# Patient Record
Sex: FEMALE | Race: WHITE | NOT HISPANIC OR LATINO | Employment: PART TIME | URBAN - METROPOLITAN AREA
[De-identification: names, ages, dates, MRNs, and addresses within clinical notes are randomized per-mention and may not be internally consistent; named-entity substitution may affect disease eponyms.]

---

## 2016-09-21 LAB
EXTERNAL ABO GROUPING: NORMAL
EXTERNAL ANTIBODY SCREEN: NEGATIVE
EXTERNAL GROUP B STREP ANTIGEN: NEGATIVE
EXTERNAL HEPATITIS B SURFACE ANTIGEN: NEGATIVE
EXTERNAL HEPATITIS C, RNA QUANT PCR: NON REACTIVE
EXTERNAL RH FACTOR: POSITIVE
EXTERNAL RUBELLA QUALITATIVE: NORMAL
EXTERNAL SYPHILIS RPR SCREEN: NORMAL
EXTERNAL URINE DRUG SCREEN: NEGATIVE

## 2017-01-04 ENCOUNTER — ROUTINE PRENATAL (OUTPATIENT)
Dept: OBSTETRICS AND GYNECOLOGY | Facility: CLINIC | Age: 22
End: 2017-01-04

## 2017-01-04 VITALS — SYSTOLIC BLOOD PRESSURE: 110 MMHG | DIASTOLIC BLOOD PRESSURE: 80 MMHG | WEIGHT: 205.2 LBS | BODY MASS INDEX: 34.15 KG/M2

## 2017-01-04 DIAGNOSIS — Z34.03 ENCOUNTER FOR SUPERVISION OF NORMAL FIRST PREGNANCY IN THIRD TRIMESTER: Primary | ICD-10-CM

## 2017-01-04 LAB
BILIRUB BLD-MCNC: NEGATIVE MG/DL
GLUCOSE UR STRIP-MCNC: NEGATIVE MG/DL
KETONES UR QL: NEGATIVE
LEUKOCYTE EST, POC: NEGATIVE
NITRITE UR-MCNC: NEGATIVE MG/ML
PH UR: 6 [PH] (ref 5–8)
PROT UR STRIP-MCNC: NEGATIVE MG/DL
RBC # UR STRIP: NEGATIVE /UL
SP GR UR: 1.02 (ref 1–1.03)
UROBILINOGEN UR QL: NORMAL

## 2017-01-04 PROCEDURE — 99213 OFFICE O/P EST LOW 20 MIN: CPT | Performed by: OBSTETRICS & GYNECOLOGY

## 2017-01-04 NOTE — MR AVS SNAPSHOT
Sari Husaind   1/4/2017 10:10 AM   Routine Prenatal    Dept Phone:  [phone]   Encounter #:  [id number]    Provider:  Abe Mendenhall MD   Department:  [facility] OB GYN                Your Full Care Plan              Your Updated Medication List          This list is accurate as of: 1/4/17 11:03 AM.  Always use your most recent med list.                MULTIVITAMIN GUMMIES ADULT PO       psyllium 0.52 G capsule   Commonly known as:  METAMUCIL   Take 3 caps every morning and 3 caps every evening.  Drink plenty of water!       Senna 8.6 MG capsule   Take 2 capsules once a day.  Do not take more than 1-2 times per week.               We Performed the Following     POC Urinalysis Dipstick       You Were Diagnosed With        Codes Comments    Encounter for supervision of normal first pregnancy in third trimester    -  Primary ICD-10-CM: Z34.03  ICD-9-CM: V22.0       Instructions     None    Patient Instructions History      Upcoming Appointments     Visit Type Date Time Department    OB FOLLOWUP 1/4/2017 10:10 AM [facility] Catalyst Repository SystemsCHAY VORASymphonyDIANA Australian    OB FOLLOWUP 1/11/2017 10:30 AM [facility] OBGYN SHERRELL Australian      Interacting TechnologyBaldwin Signup     Our records indicate that you have an active MandaeismTake Me Home Taxi account.    You can view your After Visit Summary by going to Pelotonics and logging in with your Just [employer] username and password.  If you don't have a Just [employer] username and password but a parent or guardian has access to your record, the parent or guardian should login with their own [employer] username and password and access your record to view the After Visit Summary.    If you have questions, you can email [email or web address] or call [phone] to talk to our [employer] staff.  Remember, [employer] is NOT to be used for urgent needs.  For medical emergencies, dial 911.               Other Info from Your Visit           Your Appointments     Jan 11, 2017 10:30 AM [facility]   OB  FOLLOWUP with Abe Mendenhall MD   [facility] OB GYN (--)    [address] [place] [address]   [phone]              Allergies     No Known Allergies      Reason for Visit     Routine Prenatal Visit 36W6D      Vital Signs     Blood Pressure Weight Body Mass Index Smoking Status          110/80 205 lb 3.2 oz (93.1 kg) 34.15 kg/m2 Never Smoker        Problems and Diagnoses Noted     Prenatal care    -  Primary      Results     POC Urinalysis Dipstick      Component Value Standard Range & Units    Glucose, UA Negative Negative, 1000 mg/dL (3+) mg/dL    Bilirubin Negative Negative    Ketones, UA Negative Negative    Specific Gravity  1.025 1.005 - 1.030    Blood, UA Negative Negative    pH, Urine 6.0 5.0 - 8.0    Protein, POC Negative Negative mg/dL    Urobilinogen, UA Normal Normal    Leukocytes Negative Negative    Nitrite, UA Negative Negative

## 2017-01-11 ENCOUNTER — ROUTINE PRENATAL (OUTPATIENT)
Dept: OBSTETRICS AND GYNECOLOGY | Facility: CLINIC | Age: 22
End: 2017-01-11

## 2017-01-11 VITALS — BODY MASS INDEX: 35.25 KG/M2 | DIASTOLIC BLOOD PRESSURE: 80 MMHG | WEIGHT: 211.8 LBS | SYSTOLIC BLOOD PRESSURE: 122 MMHG

## 2017-01-11 DIAGNOSIS — Z34.03 ENCOUNTER FOR SUPERVISION OF NORMAL FIRST PREGNANCY IN THIRD TRIMESTER: Primary | ICD-10-CM

## 2017-01-11 LAB
BILIRUB BLD-MCNC: NEGATIVE MG/DL
GLUCOSE UR STRIP-MCNC: NEGATIVE MG/DL
KETONES UR QL: NEGATIVE
LEUKOCYTE EST, POC: NEGATIVE
NITRITE UR-MCNC: NEGATIVE MG/ML
PH UR: 6 [PH] (ref 5–8)
PROT UR STRIP-MCNC: NEGATIVE MG/DL
RBC # UR STRIP: NORMAL /UL
SP GR UR: 1.01 (ref 1–1.03)
UROBILINOGEN UR QL: NORMAL

## 2017-01-11 PROCEDURE — 99212 OFFICE O/P EST SF 10 MIN: CPT | Performed by: OBSTETRICS & GYNECOLOGY

## 2017-01-11 NOTE — MR AVS SNAPSHOT
Sari Husaind   1/11/2017 10:30 AM   Routine Prenatal    Dept Phone:  [phone]   Encounter #:  [id number]    Provider:  Abe Mendenhall MD   Department:  [facility] OB GYN                Your Full Care Plan              Your Updated Medication List          This list is accurate as of: 1/11/17 11:36 AM.  Always use your most recent med list.                MULTIVITAMIN GUMMIES ADULT PO       psyllium 0.52 G capsule   Commonly known as:  METAMUCIL   Take 3 caps every morning and 3 caps every evening.  Drink plenty of water!       Senna 8.6 MG capsule   Take 2 capsules once a day.  Do not take more than 1-2 times per week.               We Performed the Following     POC Urinalysis Dipstick       You Were Diagnosed With        Codes Comments    Encounter for supervision of normal first pregnancy in third trimester    -  Primary ICD-10-CM: Z34.03  ICD-9-CM: V22.0       Instructions     None    Patient Instructions History      Upcoming Appointments     Visit Type Date Time Department    OB FOLLOWUP 1/11/2017 10:30 AM Decis[employer]CHAY VORADIANA [facility]    OB FOLLOWUP 1/18/2017 10:30 AM [facility] [place] [facility]      AfterStepsBenton Signup     Our records indicate that you have an active Jehovah's witnessPetBox account.    You can view your After Visit Summary by going to Plextronics and logging in with your [employer] username and password.  If you don't have a [employer] username and password but a parent or guardian has access to your record, the parent or guardian should login with their own [employer] username and password and access your record to view the After Visit Summary.    If you have questions, you can email [email or web address] [email or web address] or call [phone] to talk to our [employer] staff.  Remember, [employer] is NOT to be used for urgent needs.  For medical emergencies, dial 911.               Other Info from Your Visit           Your Appointments     Jan 18, 2017 10:30 AM EST   OB  FOLLOWUP with Abe Mendenhall MD   [facility] OB GYN (--)    [address] [place] [address]   [phone]              Allergies     No Known Allergies      Reason for Visit     Routine Prenatal Visit 37W6D      Vital Signs     Blood Pressure Weight Body Mass Index Smoking Status          122/80 211 lb 12.8 oz (96.1 kg) 35.25 kg/m2 Never Smoker        Problems and Diagnoses Noted     Prenatal care    -  Primary      Results     POC Urinalysis Dipstick      Component Value Standard Range & Units    Glucose, UA Negative Negative, 1000 mg/dL (3+) mg/dL    Bilirubin Negative Negative    Ketones, UA Negative Negative    Specific Gravity  1.015 1.005 - 1.030    Blood, UA Trace Negative    pH, Urine 6.0 5.0 - 8.0    Protein, POC Negative Negative mg/dL    Urobilinogen, UA Normal Normal    Leukocytes Negative Negative    Nitrite, UA Negative Negative

## 2017-01-18 ENCOUNTER — ROUTINE PRENATAL (OUTPATIENT)
Dept: OBSTETRICS AND GYNECOLOGY | Facility: CLINIC | Age: 22
End: 2017-01-18

## 2017-01-18 VITALS — SYSTOLIC BLOOD PRESSURE: 120 MMHG | BODY MASS INDEX: 35.51 KG/M2 | WEIGHT: 213.4 LBS | DIASTOLIC BLOOD PRESSURE: 80 MMHG

## 2017-01-18 DIAGNOSIS — Z34.03 ENCOUNTER FOR SUPERVISION OF NORMAL FIRST PREGNANCY IN THIRD TRIMESTER: Primary | ICD-10-CM

## 2017-01-18 LAB
BILIRUB BLD-MCNC: NEGATIVE MG/DL
GLUCOSE UR STRIP-MCNC: NEGATIVE MG/DL
KETONES UR QL: NEGATIVE
LEUKOCYTE EST, POC: NEGATIVE
NITRITE UR-MCNC: NEGATIVE MG/ML
PH UR: 5.5 [PH] (ref 5–8)
PROT UR STRIP-MCNC: NEGATIVE MG/DL
RBC # UR STRIP: NORMAL /UL
SP GR UR: 1.02 (ref 1–1.03)
UROBILINOGEN UR QL: NORMAL

## 2017-01-18 PROCEDURE — 99212 OFFICE O/P EST SF 10 MIN: CPT | Performed by: OBSTETRICS & GYNECOLOGY

## 2017-01-18 NOTE — PROGRESS NOTES
1.  Labor warnings and rupture membrane warnings given  2.  Counseled regarding delivery.  The patient would like to avoid induction unless it is medically necessary.  Counseled.

## 2017-01-18 NOTE — MR AVS SNAPSHOT
Sari Husaind   1/18/2017 10:30 AM   Routine Prenatal    Dept Phone:  [phone]   Encounter #:  [id number]    Provider:  Abe Mendenhall MD   Department:  [facility] OB GYN                Your Full Care Plan              Your Updated Medication List          This list is accurate as of: 1/18/17 11:39 AM.  Always use your most recent med list.                MULTIVITAMIN GUMMIES ADULT PO       psyllium 0.52 G capsule   Commonly known as:  METAMUCIL   Take 3 caps every morning and 3 caps every evening.  Drink plenty of water!       Senna 8.6 MG capsule   Take 2 capsules once a day.  Do not take more than 1-2 times per week.               We Performed the Following     POC Urinalysis Dipstick       You Were Diagnosed With        Codes Comments    Encounter for supervision of normal first pregnancy in third trimester    -  Primary ICD-10-CM: Z34.03  ICD-9-CM: V22.0       Instructions     None    Patient Instructions History      Upcoming Appointments     Visit Type Date Time Department    OB FOLLOWUP 1/18/2017 10:30 AM YouFastUnlockCHAY VORAShopAdvisorDIANA Russian    OB FOLLOWUP 1/25/2017 10:50 AM [facility] OBGYN VIELKADIANA Russian      [facility] Signup     Our records indicate that you have an active HoahaoismEndoEvolution account.    You can view your After Visit Summary by going to CliqSearch and logging in with your betNOW username and password.  If you don't have a betNOW username and password but a parent or guardian has access to your record, the parent or guardian should login with their own betNOW username and password and access your record to view the After Visit Summary.    If you have questions, you can email [email or web address] or call [phone] to talk to our betNOW staff.  Remember, betNOW is NOT to be used for urgent needs.  For medical emergencies, dial 911.               Other Info from Your Visit           Your Appointments     Jan 25, 2017 10:50 AM [facility]   OB  FOLLOWUP with Abe Mendenhall MD   [facility] OB GYN (--)    [address] [place] [id number] [phone]              Allergies     No Known Allergies      Reason for Visit     Routine Prenatal Visit 38W6D      Vital Signs     Blood Pressure Weight Body Mass Index Smoking Status          120/80 213 lb 6.4 oz (96.8 kg) 35.51 kg/m2 Never Smoker        Problems and Diagnoses Noted     Prenatal care    -  Primary

## 2017-01-25 ENCOUNTER — ROUTINE PRENATAL (OUTPATIENT)
Dept: OBSTETRICS AND GYNECOLOGY | Facility: CLINIC | Age: 22
End: 2017-01-25

## 2017-01-25 VITALS — BODY MASS INDEX: 35.88 KG/M2 | DIASTOLIC BLOOD PRESSURE: 70 MMHG | WEIGHT: 215.6 LBS | SYSTOLIC BLOOD PRESSURE: 114 MMHG

## 2017-01-25 DIAGNOSIS — Z34.03 ENCOUNTER FOR SUPERVISION OF NORMAL FIRST PREGNANCY IN THIRD TRIMESTER: Primary | ICD-10-CM

## 2017-01-25 LAB
BILIRUB BLD-MCNC: NEGATIVE MG/DL
GLUCOSE UR STRIP-MCNC: NEGATIVE MG/DL
KETONES UR QL: NEGATIVE
LEUKOCYTE EST, POC: NEGATIVE
NITRITE UR-MCNC: NEGATIVE MG/ML
PH UR: 6.5 [PH] (ref 5–8)
PROT UR STRIP-MCNC: NEGATIVE MG/DL
RBC # UR STRIP: NORMAL /UL
SP GR UR: 1.02 (ref 1–1.03)
UROBILINOGEN UR QL: NORMAL

## 2017-01-25 PROCEDURE — 99212 OFFICE O/P EST SF 10 MIN: CPT | Performed by: OBSTETRICS & GYNECOLOGY

## 2017-01-25 NOTE — MR AVS SNAPSHOT
Sari Husaind   1/25/2017 10:50 AM   Routine Prenatal    Dept Phone:  [phone]   Encounter #:  [id number]    Provider:  Abe Mendenhall MD   Department:  [facility] OB GYN                Your Full Care Plan              Your Updated Medication List          This list is accurate as of: 1/25/17 12:39 PM.  Always use your most recent med list.                MULTIVITAMIN GUMMIES ADULT PO       psyllium 0.52 G capsule   Commonly known as:  METAMUCIL   Take 3 caps every morning and 3 caps every evening.  Drink plenty of water!       Senna 8.6 MG capsule   Take 2 capsules once a day.  Do not take more than 1-2 times per week.               We Performed the Following     POC Urinalysis Dipstick       You Were Diagnosed With        Codes Comments    Encounter for supervision of normal first pregnancy in third trimester    -  Primary ICD-10-CM: Z34.03  ICD-9-CM: V22.0       Instructions     None    Patient Instructions History      Upcoming Appointments     Visit Type Date Time Department    OB FOLLOWUP 1/25/2017 10:50 AM RxCost ContainmentCHAY VORAEcoMotorsDIANA [facility]    OB FOLLOWUP 2/1/2017 10:10 AM [facility] OBCasa GrandeCHAY VORADIANA [facility]      MyLife[place] Signup     Our records indicate that you have an active JewishKnoCo account.    You can view your After Visit Summary by going to Descomplica and logging in with your [employer] username and password.  If you don't have a [employer] username and password but a parent or guardian has access to your record, the parent or guardian should login with their own [employer] username and password and access your record to view the After Visit Summary.    If you have questions, you can email [email or web address] or call [phone] to talk to our [employer] staff.  Remember, [employer] is NOT to be used for urgent needs.  For medical emergencies, dial 911.               Other Info from Your Visit           Your Appointments     Feb 01, 2017 10:10 AM [facility]   OB  FOLLOWUP with Abe Mendenhall MD   [facility] OB GYN (--)    [address] [place] [address]   [phone]              Allergies     No Known Allergies      Reason for Visit     Routine Prenatal Visit 39W6D      Vital Signs     Blood Pressure Weight Body Mass Index Smoking Status          114/70 215 lb 9.6 oz (97.8 kg) 35.88 kg/m2 Never Smoker        Problems and Diagnoses Noted     Prenatal care    -  Primary      Results     POC Urinalysis Dipstick      Component Value Standard Range & Units    Glucose, UA Negative Negative, 1000 mg/dL (3+) mg/dL    Bilirubin Negative Negative    Ketones, UA Negative Negative    Specific Gravity  1.020 1.005 - 1.030    Blood, UA Trace Negative    pH, Urine 6.5 5.0 - 8.0    Protein, POC Negative Negative mg/dL    Urobilinogen, UA Normal Normal    Leukocytes Negative Negative    Nitrite, UA Negative Negative

## 2017-01-30 ENCOUNTER — TELEPHONE (OUTPATIENT)
Dept: GASTROENTEROLOGY | Facility: CLINIC | Age: 22
End: 2017-01-30

## 2017-01-30 RX ORDER — LACTULOSE 10 G/15ML
20 SOLUTION ORAL 3 TIMES DAILY
Qty: 946 ML | Refills: 0 | Status: SHIPPED | OUTPATIENT
Start: 2017-01-30 | End: 2017-02-07 | Stop reason: HOSPADM

## 2017-01-30 NOTE — TELEPHONE ENCOUNTER
I have ordered lactulose for her.  Again, it is to be used short term. She can take it 3 times a day for no more than 3 days or until she has a BM

## 2017-02-01 ENCOUNTER — ROUTINE PRENATAL (OUTPATIENT)
Dept: OBSTETRICS AND GYNECOLOGY | Facility: CLINIC | Age: 22
End: 2017-02-01

## 2017-02-01 VITALS — DIASTOLIC BLOOD PRESSURE: 88 MMHG | BODY MASS INDEX: 36.14 KG/M2 | SYSTOLIC BLOOD PRESSURE: 120 MMHG | WEIGHT: 217.2 LBS

## 2017-02-01 DIAGNOSIS — Z3A.40 40 WEEKS GESTATION OF PREGNANCY: Primary | ICD-10-CM

## 2017-02-01 PROCEDURE — 99213 OFFICE O/P EST LOW 20 MIN: CPT | Performed by: OBSTETRICS & GYNECOLOGY

## 2017-02-01 NOTE — TELEPHONE ENCOUNTER
Pt called and advised per Dr Richmond that she ordered Lactulose.  It is to be used short term.  She can take it 3x per for no more that 3 days or until she has bm.    Pt verb understanding and reports she has not had her baby yet but is seeing her OBGYN doctor today.  Also pt reports she has not had bm.   Advised would update Dr Richmond.  Pt states she go  the lactulose.

## 2017-02-01 NOTE — PROGRESS NOTES
The patient has worsening constipation which is not responding to medical treatment.  She would like to consider induction of labor at 41 weeks.  I counseled her regarding the benefits risks and alternatives and she would like to proceed.  We will plan cervical ripening at 41 weeks with Cervidil followed by Pitocin the next morning

## 2017-02-02 ENCOUNTER — HOSPITAL ENCOUNTER (INPATIENT)
Facility: HOSPITAL | Age: 22
LOS: 5 days | Discharge: HOME | End: 2017-02-07
Attending: OBSTETRICS & GYNECOLOGY | Admitting: OBSTETRICS & GYNECOLOGY

## 2017-02-02 DIAGNOSIS — Z98.891 STATUS POST CESAREAN DELIVERY: Primary | ICD-10-CM

## 2017-02-02 DIAGNOSIS — K59.01 SLOW TRANSIT CONSTIPATION: ICD-10-CM

## 2017-02-02 LAB
ABO GROUP BLD: NORMAL
BASOPHILS # BLD AUTO: 0.01 10*3/MM3 (ref 0–0.2)
BASOPHILS NFR BLD AUTO: 0.1 % (ref 0–1.5)
BLD GP AB SCN SERPL QL: NEGATIVE
DEPRECATED RDW RBC AUTO: 46.9 FL (ref 37–54)
EOSINOPHIL # BLD AUTO: 0.13 10*3/MM3 (ref 0–0.7)
EOSINOPHIL NFR BLD AUTO: 1.4 % (ref 0.3–6.2)
ERYTHROCYTE [DISTWIDTH] IN BLOOD BY AUTOMATED COUNT: 14.5 % (ref 11.7–13)
HCT VFR BLD AUTO: 32.2 % (ref 35.6–45.5)
HGB BLD-MCNC: 10.2 G/DL (ref 11.9–15.5)
IMM GRANULOCYTES # BLD: 0.04 10*3/MM3 (ref 0–0.03)
IMM GRANULOCYTES NFR BLD: 0.4 % (ref 0–0.5)
LYMPHOCYTES # BLD AUTO: 1.86 10*3/MM3 (ref 0.9–4.8)
LYMPHOCYTES NFR BLD AUTO: 19.6 % (ref 19.6–45.3)
MCH RBC QN AUTO: 28.3 PG (ref 26.9–32)
MCHC RBC AUTO-ENTMCNC: 31.7 G/DL (ref 32.4–36.3)
MCV RBC AUTO: 89.2 FL (ref 80.5–98.2)
MONOCYTES # BLD AUTO: 0.76 10*3/MM3 (ref 0.2–1.2)
MONOCYTES NFR BLD AUTO: 8 % (ref 5–12)
NEUTROPHILS # BLD AUTO: 6.67 10*3/MM3 (ref 1.9–8.1)
NEUTROPHILS NFR BLD AUTO: 70.5 % (ref 42.7–76)
PLATELET # BLD AUTO: 194 10*3/MM3 (ref 140–500)
PMV BLD AUTO: 12.8 FL (ref 6–12)
RBC # BLD AUTO: 3.61 10*6/MM3 (ref 3.9–5.2)
RH BLD: POSITIVE
WBC NRBC COR # BLD: 9.47 10*3/MM3 (ref 4.5–10.7)

## 2017-02-02 PROCEDURE — 86901 BLOOD TYPING SEROLOGIC RH(D): CPT

## 2017-02-02 PROCEDURE — 86850 RBC ANTIBODY SCREEN: CPT

## 2017-02-02 PROCEDURE — 85025 COMPLETE CBC W/AUTO DIFF WBC: CPT | Performed by: OBSTETRICS & GYNECOLOGY

## 2017-02-02 PROCEDURE — 86900 BLOOD TYPING SEROLOGIC ABO: CPT

## 2017-02-02 RX ORDER — ONDANSETRON 4 MG/1
4 TABLET, FILM COATED ORAL EVERY 6 HOURS PRN
Status: DISCONTINUED | OUTPATIENT
Start: 2017-02-02 | End: 2017-02-04 | Stop reason: HOSPADM

## 2017-02-02 RX ORDER — ACETAMINOPHEN 650 MG/1
650 SUPPOSITORY RECTAL EVERY 4 HOURS PRN
Status: DISCONTINUED | OUTPATIENT
Start: 2017-02-02 | End: 2017-02-04 | Stop reason: HOSPADM

## 2017-02-02 RX ORDER — ONDANSETRON 2 MG/ML
4 INJECTION INTRAMUSCULAR; INTRAVENOUS EVERY 6 HOURS PRN
Status: DISCONTINUED | OUTPATIENT
Start: 2017-02-02 | End: 2017-02-04 | Stop reason: HOSPADM

## 2017-02-02 RX ORDER — TERBUTALINE SULFATE 1 MG/ML
0.25 INJECTION, SOLUTION SUBCUTANEOUS AS NEEDED
Status: DISCONTINUED | OUTPATIENT
Start: 2017-02-02 | End: 2017-02-04 | Stop reason: HOSPADM

## 2017-02-02 RX ORDER — SODIUM CHLORIDE, SODIUM LACTATE, POTASSIUM CHLORIDE, CALCIUM CHLORIDE 600; 310; 30; 20 MG/100ML; MG/100ML; MG/100ML; MG/100ML
125 INJECTION, SOLUTION INTRAVENOUS CONTINUOUS
Status: DISCONTINUED | OUTPATIENT
Start: 2017-02-02 | End: 2017-02-05

## 2017-02-02 RX ORDER — SODIUM CHLORIDE 0.9 % (FLUSH) 0.9 %
1-10 SYRINGE (ML) INJECTION AS NEEDED
Status: DISCONTINUED | OUTPATIENT
Start: 2017-02-02 | End: 2017-02-04 | Stop reason: HOSPADM

## 2017-02-02 RX ORDER — ONDANSETRON 4 MG/1
4 TABLET, ORALLY DISINTEGRATING ORAL EVERY 6 HOURS PRN
Status: DISCONTINUED | OUTPATIENT
Start: 2017-02-02 | End: 2017-02-04 | Stop reason: HOSPADM

## 2017-02-02 RX ORDER — FAMOTIDINE 20 MG/1
20 TABLET, FILM COATED ORAL 2 TIMES DAILY
Status: DISCONTINUED | OUTPATIENT
Start: 2017-02-03 | End: 2017-02-04 | Stop reason: HOSPADM

## 2017-02-02 RX ORDER — OXYTOCIN/RINGER'S LACTATE 10/500ML
2-30 PLASTIC BAG, INJECTION (ML) INTRAVENOUS
Status: DISCONTINUED | OUTPATIENT
Start: 2017-02-03 | End: 2017-02-04 | Stop reason: HOSPADM

## 2017-02-02 RX ORDER — FAMOTIDINE 10 MG/ML
20 INJECTION, SOLUTION INTRAVENOUS 2 TIMES DAILY
Status: DISCONTINUED | OUTPATIENT
Start: 2017-02-03 | End: 2017-02-04 | Stop reason: HOSPADM

## 2017-02-02 RX ADMIN — SODIUM CHLORIDE, POTASSIUM CHLORIDE, SODIUM LACTATE AND CALCIUM CHLORIDE 125 ML/HR: 600; 310; 30; 20 INJECTION, SOLUTION INTRAVENOUS at 23:00

## 2017-02-02 RX ADMIN — DINOPROSTONE 10 MG: 10 INSERT VAGINAL at 23:11

## 2017-02-03 ENCOUNTER — ANESTHESIA (OUTPATIENT)
Dept: LABOR AND DELIVERY | Facility: HOSPITAL | Age: 22
End: 2017-02-03

## 2017-02-03 ENCOUNTER — ANESTHESIA EVENT (OUTPATIENT)
Dept: LABOR AND DELIVERY | Facility: HOSPITAL | Age: 22
End: 2017-02-03

## 2017-02-03 PROBLEM — K59.01 SLOW TRANSIT CONSTIPATION: Status: ACTIVE | Noted: 2017-02-03

## 2017-02-03 PROBLEM — O48.0 POST-TERM PREGNANCY, 40-42 WEEKS OF GESTATION: Status: ACTIVE | Noted: 2017-02-03

## 2017-02-03 PROBLEM — O48.0 POST-DATES PREGNANCY: Status: ACTIVE | Noted: 2017-02-03

## 2017-02-03 PROCEDURE — 59514 CESAREAN DELIVERY ONLY: CPT | Performed by: OBSTETRICS & GYNECOLOGY

## 2017-02-03 PROCEDURE — S0260 H&P FOR SURGERY: HCPCS | Performed by: OBSTETRICS & GYNECOLOGY

## 2017-02-03 PROCEDURE — C1755 CATHETER, INTRASPINAL: HCPCS | Performed by: ANESTHESIOLOGY

## 2017-02-03 PROCEDURE — 25010000003 CEFAZOLIN IN DEXTROSE 2-4 GM/100ML-% SOLUTION: Performed by: OBSTETRICS & GYNECOLOGY

## 2017-02-03 PROCEDURE — C1755 CATHETER, INTRASPINAL: HCPCS

## 2017-02-03 PROCEDURE — 3E0P7GC INTRODUCTION OF OTHER THERAPEUTIC SUBSTANCE INTO FEMALE REPRODUCTIVE, VIA NATURAL OR ARTIFICIAL OPENING: ICD-10-PCS | Performed by: OBSTETRICS & GYNECOLOGY

## 2017-02-03 PROCEDURE — 25010000002 MORPHINE PER 10 MG: Performed by: ANESTHESIOLOGY

## 2017-02-03 PROCEDURE — 25010000002 PHENYLEPHRINE PER 1 ML: Performed by: ANESTHESIOLOGY

## 2017-02-03 PROCEDURE — 25010000002 ONDANSETRON PER 1 MG: Performed by: ANESTHESIOLOGY

## 2017-02-03 RX ORDER — LIDOCAINE HYDROCHLORIDE AND EPINEPHRINE BITARTRATE 20; .01 MG/ML; MG/ML
INJECTION, SOLUTION SUBCUTANEOUS AS NEEDED
Status: DISCONTINUED | OUTPATIENT
Start: 2017-02-03 | End: 2017-02-04 | Stop reason: SURG

## 2017-02-03 RX ORDER — MISOPROSTOL 200 UG/1
800 TABLET ORAL AS NEEDED
Status: DISCONTINUED | OUTPATIENT
Start: 2017-02-03 | End: 2017-02-04 | Stop reason: HOSPADM

## 2017-02-03 RX ORDER — PROMETHAZINE HYDROCHLORIDE 25 MG/ML
12.5 INJECTION, SOLUTION INTRAMUSCULAR; INTRAVENOUS EVERY 6 HOURS PRN
Status: CANCELLED | OUTPATIENT
Start: 2017-02-03

## 2017-02-03 RX ORDER — METHYLERGONOVINE MALEATE 0.2 MG/ML
INJECTION INTRAVENOUS
Status: DISPENSED
Start: 2017-02-03 | End: 2017-02-04

## 2017-02-03 RX ORDER — CARBOPROST TROMETHAMINE 250 UG/ML
250 INJECTION, SOLUTION INTRAMUSCULAR AS NEEDED
Status: DISCONTINUED | OUTPATIENT
Start: 2017-02-03 | End: 2017-02-04 | Stop reason: HOSPADM

## 2017-02-03 RX ORDER — CEFAZOLIN SODIUM 2 G/100ML
2 INJECTION, SOLUTION INTRAVENOUS ONCE
Status: COMPLETED | OUTPATIENT
Start: 2017-02-03 | End: 2017-02-03

## 2017-02-03 RX ORDER — METHYLERGONOVINE MALEATE 0.2 MG/ML
200 INJECTION INTRAVENOUS AS NEEDED
Status: DISCONTINUED | OUTPATIENT
Start: 2017-02-03 | End: 2017-02-04 | Stop reason: HOSPADM

## 2017-02-03 RX ORDER — MORPHINE SULFATE 1 MG/ML
INJECTION, SOLUTION EPIDURAL; INTRATHECAL; INTRAVENOUS
Status: DISPENSED
Start: 2017-02-03 | End: 2017-02-04

## 2017-02-03 RX ORDER — OXYTOCIN/RINGER'S LACTATE 10/500ML
125 PLASTIC BAG, INJECTION (ML) INTRAVENOUS CONTINUOUS PRN
Status: DISCONTINUED | OUTPATIENT
Start: 2017-02-03 | End: 2017-02-04 | Stop reason: HOSPADM

## 2017-02-03 RX ORDER — NALOXONE HCL 0.4 MG/ML
0.2 VIAL (ML) INJECTION
Status: CANCELLED | OUTPATIENT
Start: 2017-02-03

## 2017-02-03 RX ORDER — FAMOTIDINE 10 MG/ML
20 INJECTION, SOLUTION INTRAVENOUS ONCE AS NEEDED
Status: COMPLETED | OUTPATIENT
Start: 2017-02-03 | End: 2017-02-03

## 2017-02-03 RX ORDER — DIPHENHYDRAMINE HYDROCHLORIDE 50 MG/ML
12.5 INJECTION INTRAMUSCULAR; INTRAVENOUS EVERY 8 HOURS PRN
Status: DISCONTINUED | OUTPATIENT
Start: 2017-02-03 | End: 2017-02-04 | Stop reason: HOSPADM

## 2017-02-03 RX ORDER — MORPHINE SULFATE 1 MG/ML
INJECTION, SOLUTION EPIDURAL; INTRATHECAL; INTRAVENOUS AS NEEDED
Status: DISCONTINUED | OUTPATIENT
Start: 2017-02-03 | End: 2017-02-04 | Stop reason: SURG

## 2017-02-03 RX ORDER — ONDANSETRON 2 MG/ML
4 INJECTION INTRAMUSCULAR; INTRAVENOUS ONCE AS NEEDED
Status: COMPLETED | OUTPATIENT
Start: 2017-02-03 | End: 2017-02-03

## 2017-02-03 RX ORDER — OXYTOCIN 10 [USP'U]/ML
INJECTION, SOLUTION INTRAMUSCULAR; INTRAVENOUS
Status: DISPENSED
Start: 2017-02-03 | End: 2017-02-04

## 2017-02-03 RX ORDER — OXYTOCIN/RINGER'S LACTATE 10/500ML
999 PLASTIC BAG, INJECTION (ML) INTRAVENOUS ONCE
Status: COMPLETED | OUTPATIENT
Start: 2017-02-03 | End: 2017-02-03

## 2017-02-03 RX ADMIN — OXYTOCIN 999 ML/HR: 10 INJECTION, SOLUTION INTRAMUSCULAR; INTRAVENOUS at 23:11

## 2017-02-03 RX ADMIN — SODIUM CHLORIDE, POTASSIUM CHLORIDE, SODIUM LACTATE AND CALCIUM CHLORIDE 125 ML/HR: 600; 310; 30; 20 INJECTION, SOLUTION INTRAVENOUS at 12:53

## 2017-02-03 RX ADMIN — LIDOCAINE HYDROCHLORIDE AND EPINEPHRINE 5 ML: 20; 10 INJECTION, SOLUTION INFILTRATION; PERINEURAL at 22:19

## 2017-02-03 RX ADMIN — OXYTOCIN 2 MILLI-UNITS/MIN: 10 INJECTION, SOLUTION INTRAMUSCULAR; INTRAVENOUS at 07:45

## 2017-02-03 RX ADMIN — LIDOCAINE HYDROCHLORIDE AND EPINEPHRINE 4 ML: 20; 10 INJECTION, SOLUTION INFILTRATION; PERINEURAL at 23:24

## 2017-02-03 RX ADMIN — ONDANSETRON 4 MG: 2 INJECTION INTRAMUSCULAR; INTRAVENOUS at 22:32

## 2017-02-03 RX ADMIN — SODIUM CHLORIDE, POTASSIUM CHLORIDE, SODIUM LACTATE AND CALCIUM CHLORIDE 125 ML/HR: 600; 310; 30; 20 INJECTION, SOLUTION INTRAVENOUS at 20:59

## 2017-02-03 RX ADMIN — SODIUM CHLORIDE, POTASSIUM CHLORIDE, SODIUM LACTATE AND CALCIUM CHLORIDE 125 ML/HR: 600; 310; 30; 20 INJECTION, SOLUTION INTRAVENOUS at 13:53

## 2017-02-03 RX ADMIN — LIDOCAINE HYDROCHLORIDE AND EPINEPHRINE 4 ML: 20; 10 INJECTION, SOLUTION INFILTRATION; PERINEURAL at 22:29

## 2017-02-03 RX ADMIN — Medication 10 ML/HR: at 13:23

## 2017-02-03 RX ADMIN — SODIUM CHLORIDE, POTASSIUM CHLORIDE, SODIUM LACTATE AND CALCIUM CHLORIDE: 600; 310; 30; 20 INJECTION, SOLUTION INTRAVENOUS at 22:46

## 2017-02-03 RX ADMIN — FAMOTIDINE 20 MG: 10 INJECTION, SOLUTION INTRAVENOUS at 22:32

## 2017-02-03 RX ADMIN — PHENYLEPHRINE HYDROCHLORIDE 100 MCG: 10 INJECTION INTRAVENOUS at 23:30

## 2017-02-03 RX ADMIN — CEFAZOLIN SODIUM 2 G: 2 INJECTION, SOLUTION INTRAVENOUS at 22:32

## 2017-02-03 RX ADMIN — MORPHINE SULFATE 2.5 MG: 1 INJECTION EPIDURAL; INTRATHECAL; INTRAVENOUS at 23:52

## 2017-02-03 RX ADMIN — PHENYLEPHRINE HYDROCHLORIDE 200 MCG: 10 INJECTION INTRAVENOUS at 23:34

## 2017-02-03 RX ADMIN — SODIUM CHLORIDE, POTASSIUM CHLORIDE, SODIUM LACTATE AND CALCIUM CHLORIDE 125 ML/HR: 600; 310; 30; 20 INJECTION, SOLUTION INTRAVENOUS at 06:10

## 2017-02-03 RX ADMIN — LIDOCAINE HYDROCHLORIDE AND EPINEPHRINE 5 ML: 20; 10 INJECTION, SOLUTION INFILTRATION; PERINEURAL at 22:25

## 2017-02-03 NOTE — PROGRESS NOTES
2nd IUPC was not reading properly. That IUPC was removed. A new IUPC was placed. IUPC now working well. Cvx unchanged. NST Cat 1.

## 2017-02-03 NOTE — PROGRESS NOTES
FHR is category 1  Cx 90/4/-1  IUPC is in place    A/P  Slow onset of labor.  Continue to titrate pitocin to ctx using IUPC.

## 2017-02-03 NOTE — H&P
H&P Note    Patient Identification:  Name: Sari Lees  Age: 21 y.o.  Sex: female  :  1995  MRN: [id number]                       Chief Complaint:  Post dates    History of Present Illness:   21-year-old  1 para 0 presents at 41 and one sevenths weeks for an induction of labor due to postdates as well as worsening constipation.  The patient has been under the care of a gastroenterologist for constipation throughout pregnancy.  This is no longer responding to medical treatment.    Problem List:  @[facility]aint [facility] [place]@  Past Medical History:  Past Medical History   Diagnosis Date   • History of pancreatitis    • IBS (irritable bowel syndrome)      Past Surgical History:  Past Surgical History   Procedure Laterality Date   • Cholecystectomy     • Colonoscopy  < 2yrs ago     Had done [facility] by Dr Sergei Junior   • Upper gastrointestinal endoscopy       Done  at [facility] by  Dr Sergei Junior.   • Ercp       Had at [facility] approx one yr ago      Home Meds:  Prescriptions Prior to Admission   Medication Sig Dispense Refill Last Dose   • Multiple Vitamins-Minerals (MULTIVITAMIN GUMMIES ADULT PO)    2017 at Unknown time   • Sennosides (SENNA) 8.6 MG capsule Take 2 capsules once a day.  Do not take more than 1-2 times per week. 30 each 1 Past Week at Unknown time   • [] dinoprostone (CERVIDIL) 10 MG vaginal insert Insert 1 each into the vagina 1 (One) Time for 1 dose. Place Cervidil at 2000 tonight.  Removed at 08 100 and begin Pitocin at 09 100 on 2/3 1 each 0    • [] lactulose (CHRONULAC) 10 GM/15ML solution Take 30 mL by mouth 3 (Three) Times a Day for 3 days. Or until you have a bowel movement 946 mL 0 Unknown at Unknown time   • psyllium (METAMUCIL) 0.52 G capsule Take 3 caps every morning and 3 caps every evening.  Drink plenty of water! 180 capsule 3 More than a month at Unknown time     Current Meds:   [unfilled]  Allergies:  No Known Allergies  Immunizations:  Immunization History    Administered Date(s) Administered   • Influenza (IM) Preservative Free 2016   • Tdap 2016     Social History:   Social History   Substance Use Topics   • Smoking status: Never Smoker   • Smokeless tobacco: Never Used   • Alcohol use No      Comment: OCC. BEFORE PREGNANCY      Family History:  Family History   Problem Relation Age of Onset   • Adopted: Yes   • No Known Problems Mother    • No Known Problems Father    • No Known Problems Sister    • No Known Problems Brother    • No Known Problems Maternal Aunt    • No Known Problems Maternal Uncle    • No Known Problems Paternal Aunt    • No Known Problems Paternal Uncle    • No Known Problems Maternal Grandmother    • No Known Problems Maternal Grandfather    • No Known Problems Paternal Grandmother    • No Known Problems Paternal Grandfather    • Celiac disease Neg Hx    • Cirrhosis Neg Hx    • Colon cancer Neg Hx    • Colon polyps Neg Hx    • Crohn's disease Neg Hx    • Cystic fibrosis Neg Hx    • Esophageal cancer Neg Hx    • Hemochromatosis Neg Hx    • Inflammatory bowel disease Neg Hx    • Irritable bowel syndrome Neg Hx    • Liver cancer Neg Hx    • Liver disease Neg Hx    • Rectal cancer Neg Hx    • Stomach cancer Neg Hx    • Ulcerative colitis Neg Hx    • Ravinder's disease Neg Hx    • Alcohol abuse Neg Hx    • Pancreatitis Neg Hx         Review of Systems  A comprehensive review of systems was negative.    Objective:  tMax 24 hrs: Temp (24hrs), Av.4 °F (36.9 °C), Min:98.2 °F (36.8 °C), Max:98.5 °F (36.9 °C)    Vitals Ranges:   Temp:  [98.2 °F (36.8 °C)-98.5 °F (36.9 °C)] 98.5 °F (36.9 °C)  Heart Rate:  [] 96  Resp:  [18] 18  BP: ()/(53-81) 114/73  Intake and Output Last 3 Shifts:        Exam:     General Appearance:    Alert, cooperative, no distress, appears stated age   Head:    Normocephalic, without obvious abnormality, atraumatic   Back:     Symmetric, no curvature, ROM normal, no CVA tenderness   Lungs:     Clear to  auscultation bilaterally, respirations unlabored   Chest Wall:    No tenderness or deformity    Heart:    Regular rate and rhythm, S1 and S2 normal, no murmur, rub   or gallop   Abdomen:     Soft, non-tender, bowel sounds active all four quadrants,     no masses, no organomegaly   Genitalia:    Cx 70/2.5/-1   Extremities:   Extremities normal, atraumatic, no cyanosis or edema   Skin:   Skin color, texture, turgor normal, no rashes or lesions   Lymph nodes:   Cervical, supraclavicular, and axillary nodes normal       Data Review:  CBC:   Results from last 7 days  Lab Units 02/02/17 2301   WBC 10*3/mm3 9.47   RBC 10*6/mm3 3.61*      Lab Results (last 24 hours)     Procedure Component Value Units Date/Time    CBC & Differential [[id number]] Collected:  02/02/17 2301    Specimen:  Blood Updated:  02/02/17 2314    Narrative:       The following orders were created for panel order CBC & Differential.  Procedure                               Abnormality         Status                     ---------                               -----------         ------                     CBC Auto Differential[[id number]]         Abnormal            Final result                 Please view results for these tests on the individual orders.    CBC Auto Differential [[id number]]  (Abnormal) Collected:  02/02/17 2301    Specimen:  Blood Updated:  02/02/17 2314     WBC 9.47 10*3/mm3      RBC 3.61 (L) 10*6/mm3      Hemoglobin 10.2 (L) g/dL      Hematocrit 32.2 (L) %      MCV 89.2 fL      MCH 28.3 pg      MCHC 31.7 (L) g/dL      RDW 14.5 (H) %      RDW-SD 46.9 fl      MPV 12.8 (H) fL      Platelets 194 10*3/mm3      Neutrophil % 70.5 %      Lymphocyte % 19.6 %      Monocyte % 8.0 %      Eosinophil % 1.4 %      Basophil % 0.1 %      Immature Grans % 0.4 %      Neutrophils, Absolute 6.67 10*3/mm3      Lymphocytes, Absolute 1.86 10*3/mm3      Monocytes, Absolute 0.76 10*3/mm3      Eosinophils, Absolute 0.13 10*3/mm3      Basophils, Absolute 0.01  10*3/mm3      Immature Grans, Absolute 0.04 (H) 10*3/mm3     Group B Strep Culture [[id number]] Resulted:  09/21/16     Specimen:  Swab Updated:  02/03/17 0048     External Strep Group B Ag Negative     Hepatitis C RNA, Quantitative, PCR (graph) [[id number]] Resulted:  09/21/16     Specimen:  Blood Updated:  02/03/17 0048     External Hepatitis C, RNA Quant PCR non reactive     Urine Drug Screen [[id number]] Resulted:  09/21/16     Specimen:  Urine from Urine, Clean Catch Updated:  02/03/17 0048     External Urine Drug Screen negative     Hepatitis B Surface Antigen [[id number]] Resulted:  09/21/16     Specimen:  Blood Updated:  02/03/17 0048     External Hepatitis B Surface Ag Negative     RPR [[id number]] Resulted:  09/21/16     Specimen:  Blood Updated:  02/03/17 0048     External RPR Non-Reactive     Rubella Antibody, IgG [[id number]] Resulted:  09/21/16     Specimen:  Blood Updated:  02/03/17 0048     External Rubella Qual Immune         Assessment:  Active Problems:    Pregnancy      Intrauterine pregnancy at 41 and one sevenths weeks  Constipation which is no longer responding to medical treatment    Plan:  Induction of labor.  I have counseled the patient regarding the benefits and risks as well as the alternatives to induction of labor.  The patient understands these and would like to proceed.  Fetal heart tones are category 1.  The cervix, after Cervidil, 70% effaced and 2.5 cm dilated.  Amniotomy was performed with return of clear fluid    Abe Mendenhall MD  2/3/2017

## 2017-02-03 NOTE — ANESTHESIA PROCEDURE NOTES
Labor Epidural    Patient location during procedure: OB  Performed By  Anesthesiologist: SEAN LOUIS  Preanesthetic Checklist  Completed: patient identified, site marked, surgical consent, pre-op evaluation, timeout performed, IV checked, risks and benefits discussed and monitors and equipment checked  Additional Notes  Pt monitored by OB RN staff  Test dose -- 3cc 1.5% lidocaine with epi -- neg for HR change, neg for SAB  Epidural Block Prep:  Pt Position:sitting  Sterile Tech:cap, gloves, mask and sterile barrier  Monitoring:blood pressure monitoring, continuous pulse oximetry and EKG  Epidural Block Procedure:  Approach:midline  Guidance:palpation technique  Location:L4-L5  Needle Type:Tuohy  Needle Gauge:17  Loss of Resistance: 7cm  Cath Depth at skin:13 cm  Paresthesia: none  Aspiration:negative  Test Dose:negative  Post Assessment:  Dressing:occlusive dressing applied and secured with tape  Pt Tolerance:patient tolerated the procedure well with no apparent complications  Complications:no

## 2017-02-03 NOTE — PLAN OF CARE
Problem: Patient Care Overview (Adult)  Goal: Plan of Care Review  Outcome: Ongoing (interventions implemented as appropriate)    02/03/17 0646   Coping/Psychosocial Response Interventions   Plan Of Care Reviewed With patient   Patient Care Overview   Progress improving       Goal: Adult Individualization and Mutuality  Outcome: Ongoing (interventions implemented as appropriate)    02/03/17 0646   Individualization   Patient Specific Preferences ECTOR at delivery, epidural for pain control    Patient Specific Goals pain control, healthy mom and baby    Patient Specific Interventions epidural for pain    Mutuality/Individual Preferences   What Anxieties, Fears or Concerns Do You Have About Your Health or Care? none   What Questions Do You Have About Your Health or Care? none       Goal: Discharge Needs Assessment  Outcome: Ongoing (interventions implemented as appropriate)    02/03/17 0646   Discharge Needs Assessment   Concerns To Be Addressed no discharge needs identified   Readmission Within The Last 30 Days no previous admission in last 30 days   Equipment Needed After Discharge none   Current Health   Anticipated Changes Related to Illness none   Self-Care   Equipment Currently Used at Home none   Living Environment   Transportation Available car         Problem: Labor (Cervical Ripen, Induct, Augment) (Adult,Obstetrics,Pediatric)  Goal: Signs and Symptoms of Listed Potential Problems Will be Absent or Manageable (Labor)  Outcome: Ongoing (interventions implemented as appropriate)    02/03/17 0646   Labor (Cervical Ripen, Induct, Augment)   Problems Assessed (Labor) all   Problems Present (Labor) none

## 2017-02-03 NOTE — ANESTHESIA PREPROCEDURE EVALUATION
Anesthesia Evaluation     Patient summary reviewed and Nursing notes reviewed    Airway   Mallampati: II  TM distance: >3 FB  Neck ROM: full  Dental - normal exam     Pulmonary - negative pulmonary ROS and normal exam   Cardiovascular - negative cardio ROS and normal exam    Neuro/Psych- negative ROS  GI/Hepatic/Renal/Endo    (+) obesity,      Musculoskeletal (-) negative ROS    Abdominal    Substance History - negative use     OB/GYN    (+) Pregnant,         Other                             Anesthesia Plan    ASA 2     epidural     Anesthetic plan and risks discussed with patient.

## 2017-02-04 PROBLEM — Z98.891 STATUS POST CESAREAN DELIVERY: Status: ACTIVE | Noted: 2017-02-04

## 2017-02-04 PROCEDURE — 99232 SBSQ HOSP IP/OBS MODERATE 35: CPT | Performed by: OBSTETRICS & GYNECOLOGY

## 2017-02-04 RX ORDER — METHYLERGONOVINE MALEATE 0.2 MG/ML
200 INJECTION INTRAVENOUS ONCE
Status: DISCONTINUED | OUTPATIENT
Start: 2017-02-04 | End: 2017-02-07 | Stop reason: HOSPADM

## 2017-02-04 RX ORDER — BISACODYL 10 MG
10 SUPPOSITORY, RECTAL RECTAL DAILY PRN
Status: DISCONTINUED | OUTPATIENT
Start: 2017-02-04 | End: 2017-02-07 | Stop reason: HOSPADM

## 2017-02-04 RX ORDER — ONDANSETRON 2 MG/ML
4 INJECTION INTRAMUSCULAR; INTRAVENOUS EVERY 6 HOURS PRN
Status: DISCONTINUED | OUTPATIENT
Start: 2017-02-04 | End: 2017-02-07 | Stop reason: HOSPADM

## 2017-02-04 RX ORDER — IBUPROFEN 800 MG/1
800 TABLET ORAL EVERY 8 HOURS SCHEDULED
Status: DISCONTINUED | OUTPATIENT
Start: 2017-02-04 | End: 2017-02-07 | Stop reason: HOSPADM

## 2017-02-04 RX ORDER — OXYCODONE HYDROCHLORIDE AND ACETAMINOPHEN 5; 325 MG/1; MG/1
1 TABLET ORAL EVERY 4 HOURS PRN
Status: DISCONTINUED | OUTPATIENT
Start: 2017-02-04 | End: 2017-02-07 | Stop reason: HOSPADM

## 2017-02-04 RX ORDER — DIPHENHYDRAMINE HCL 25 MG
25 CAPSULE ORAL EVERY 4 HOURS PRN
Status: DISCONTINUED | OUTPATIENT
Start: 2017-02-04 | End: 2017-02-07 | Stop reason: HOSPADM

## 2017-02-04 RX ORDER — ACETAMINOPHEN 325 MG/1
650 TABLET ORAL EVERY 4 HOURS PRN
Status: DISCONTINUED | OUTPATIENT
Start: 2017-02-04 | End: 2017-02-07 | Stop reason: HOSPADM

## 2017-02-04 RX ORDER — OXYTOCIN/RINGER'S LACTATE 10/500ML
125 PLASTIC BAG, INJECTION (ML) INTRAVENOUS CONTINUOUS PRN
Status: ACTIVE | OUTPATIENT
Start: 2017-02-04 | End: 2017-02-05

## 2017-02-04 RX ORDER — PRENATAL VIT NO.126/IRON/FOLIC 28MG-0.8MG
1 TABLET ORAL DAILY
Status: DISCONTINUED | OUTPATIENT
Start: 2017-02-04 | End: 2017-02-07 | Stop reason: HOSPADM

## 2017-02-04 RX ORDER — SIMETHICONE 80 MG
80 TABLET,CHEWABLE ORAL 4 TIMES DAILY PRN
Status: DISCONTINUED | OUTPATIENT
Start: 2017-02-04 | End: 2017-02-07 | Stop reason: HOSPADM

## 2017-02-04 RX ORDER — OXYTOCIN/RINGER'S LACTATE 10/500ML
999 PLASTIC BAG, INJECTION (ML) INTRAVENOUS ONCE
Status: DISCONTINUED | OUTPATIENT
Start: 2017-02-04 | End: 2017-02-07 | Stop reason: HOSPADM

## 2017-02-04 RX ORDER — ONDANSETRON 4 MG/1
4 TABLET, ORALLY DISINTEGRATING ORAL EVERY 6 HOURS PRN
Status: DISCONTINUED | OUTPATIENT
Start: 2017-02-04 | End: 2017-02-07 | Stop reason: HOSPADM

## 2017-02-04 RX ORDER — OXYCODONE AND ACETAMINOPHEN 10; 325 MG/1; MG/1
1 TABLET ORAL EVERY 4 HOURS PRN
Status: DISCONTINUED | OUTPATIENT
Start: 2017-02-04 | End: 2017-02-07 | Stop reason: HOSPADM

## 2017-02-04 RX ORDER — ONDANSETRON 2 MG/ML
4 INJECTION INTRAMUSCULAR; INTRAVENOUS ONCE AS NEEDED
Status: DISCONTINUED | OUTPATIENT
Start: 2017-02-04 | End: 2017-02-07 | Stop reason: HOSPADM

## 2017-02-04 RX ORDER — ONDANSETRON 4 MG/1
4 TABLET, FILM COATED ORAL EVERY 6 HOURS PRN
Status: DISCONTINUED | OUTPATIENT
Start: 2017-02-04 | End: 2017-02-07 | Stop reason: HOSPADM

## 2017-02-04 RX ORDER — OXYCODONE HYDROCHLORIDE AND ACETAMINOPHEN 5; 325 MG/1; MG/1
TABLET ORAL
Status: COMPLETED
Start: 2017-02-04 | End: 2017-02-04

## 2017-02-04 RX ORDER — SODIUM CHLORIDE 0.9 % (FLUSH) 0.9 %
1-10 SYRINGE (ML) INJECTION AS NEEDED
Status: DISCONTINUED | OUTPATIENT
Start: 2017-02-04 | End: 2017-02-07 | Stop reason: HOSPADM

## 2017-02-04 RX ORDER — IBUPROFEN 800 MG/1
800 TABLET ORAL EVERY 6 HOURS PRN
Status: DISCONTINUED | OUTPATIENT
Start: 2017-02-04 | End: 2017-02-04 | Stop reason: HOSPADM

## 2017-02-04 RX ORDER — OXYCODONE HYDROCHLORIDE AND ACETAMINOPHEN 5; 325 MG/1; MG/1
2 TABLET ORAL EVERY 4 HOURS PRN
Status: DISCONTINUED | OUTPATIENT
Start: 2017-02-04 | End: 2017-02-04 | Stop reason: HOSPADM

## 2017-02-04 RX ORDER — DOCUSATE SODIUM 100 MG/1
100 CAPSULE, LIQUID FILLED ORAL 2 TIMES DAILY
Status: DISCONTINUED | OUTPATIENT
Start: 2017-02-04 | End: 2017-02-07 | Stop reason: HOSPADM

## 2017-02-04 RX ORDER — DIPHENHYDRAMINE HYDROCHLORIDE 50 MG/ML
25 INJECTION INTRAMUSCULAR; INTRAVENOUS EVERY 4 HOURS PRN
Status: DISCONTINUED | OUTPATIENT
Start: 2017-02-04 | End: 2017-02-07 | Stop reason: HOSPADM

## 2017-02-04 RX ORDER — MORPHINE SULFATE 2 MG/ML
2 INJECTION, SOLUTION INTRAMUSCULAR; INTRAVENOUS
Status: ACTIVE | OUTPATIENT
Start: 2017-02-04 | End: 2017-02-05

## 2017-02-04 RX ORDER — LANOLIN 100 %
OINTMENT (GRAM) TOPICAL
Status: DISCONTINUED | OUTPATIENT
Start: 2017-02-04 | End: 2017-02-07 | Stop reason: HOSPADM

## 2017-02-04 RX ORDER — IBUPROFEN 800 MG/1
TABLET ORAL
Status: COMPLETED
Start: 2017-02-04 | End: 2017-02-04

## 2017-02-04 RX ADMIN — OXYCODONE HYDROCHLORIDE AND ACETAMINOPHEN 1 TABLET: 5; 325 TABLET ORAL at 08:51

## 2017-02-04 RX ADMIN — OXYCODONE HYDROCHLORIDE AND ACETAMINOPHEN 2 TABLET: 5; 325 TABLET ORAL at 00:30

## 2017-02-04 RX ADMIN — OXYTOCIN 125 ML/HR: 10 INJECTION, SOLUTION INTRAMUSCULAR; INTRAVENOUS at 00:30

## 2017-02-04 RX ADMIN — SIMETHICONE CHEW TAB 80 MG 80 MG: 80 TABLET ORAL at 08:51

## 2017-02-04 RX ADMIN — IBUPROFEN 800 MG: 800 TABLET ORAL at 16:48

## 2017-02-04 RX ADMIN — Medication: at 08:50

## 2017-02-04 RX ADMIN — OXYCODONE AND ACETAMINOPHEN 1 TABLET: 10; 325 TABLET ORAL at 15:59

## 2017-02-04 RX ADMIN — Medication 1 TABLET: at 08:45

## 2017-02-04 RX ADMIN — SIMETHICONE CHEW TAB 80 MG 80 MG: 80 TABLET ORAL at 16:48

## 2017-02-04 RX ADMIN — IBUPROFEN 800 MG: 800 TABLET ORAL at 08:46

## 2017-02-04 RX ADMIN — OXYCODONE AND ACETAMINOPHEN 1 TABLET: 10; 325 TABLET ORAL at 20:44

## 2017-02-04 RX ADMIN — IBUPROFEN 800 MG: 800 TABLET ORAL at 00:30

## 2017-02-04 RX ADMIN — DOCUSATE SODIUM 100 MG: 100 CAPSULE, LIQUID FILLED ORAL at 08:47

## 2017-02-04 RX ADMIN — DOCUSATE SODIUM 100 MG: 100 CAPSULE, LIQUID FILLED ORAL at 19:43

## 2017-02-04 NOTE — PROGRESS NOTES
Repeat cervical exam unchanged.  Cervix still 5/70/-2.  Significant fetal molding noted.  Again I made recommendations to proceed with  delivery for arrest of dilation.  After weighing the risks, benefits, and alternatives, the patient elects to proceed with  delivery at this time.  We will make preparations for the OR.

## 2017-02-04 NOTE — PROGRESS NOTES
"Subjective:  Postpartum Day 1:     The patient feels well.  Pain is well controlled with current medications. The baby is well.  Urinary output is adequate. The patient is ambulating well. The patient is tolerating a normal diet. Flatus has been passed.      Objective:    Vital signs in last 24 hours:  Blood pressure 110/62, pulse 115, temperature 98.1 °F (36.7 °C), temperature source Oral, resp. rate 18, height 65\" (165.1 cm), weight 223 lb (101 kg), SpO2 100 %, currently breastfeeding.      General:    alert, appears stated age and cooperative   Uterine Fundus:   firm   Incision:  healing well, no significant drainage, no dehiscence, no significant erythema     Labs    WBC   Date Value Ref Range Status   02/02/2017 9.47 4.50 - 10.70 10*3/mm3 Final     RBC   Date Value Ref Range Status   02/02/2017 3.61 (L) 3.90 - 5.20 10*6/mm3 Final     HEMOGLOBIN   Date Value Ref Range Status   02/02/2017 10.2 (L) 11.9 - 15.5 g/dL Final     HEMATOCRIT   Date Value Ref Range Status   02/02/2017 32.2 (L) 35.6 - 45.5 % Final     MCV   Date Value Ref Range Status   02/02/2017 89.2 80.5 - 98.2 fL Final     MCH   Date Value Ref Range Status   02/02/2017 28.3 26.9 - 32.0 pg Final     MCHC   Date Value Ref Range Status   02/02/2017 31.7 (L) 32.4 - 36.3 g/dL Final     RDW   Date Value Ref Range Status   02/02/2017 14.5 (H) 11.7 - 13.0 % Final     RDW-SD   Date Value Ref Range Status   02/02/2017 46.9 37.0 - 54.0 fl Final     MPV   Date Value Ref Range Status   02/02/2017 12.8 (H) 6.0 - 12.0 fL Final     PLATELETS   Date Value Ref Range Status   02/02/2017 194 140 - 500 10*3/mm3 Final     NEUTROPHIL %   Date Value Ref Range Status   02/02/2017 70.5 42.7 - 76.0 % Final     LYMPHOCYTE %   Date Value Ref Range Status   02/02/2017 19.6 19.6 - 45.3 % Final     MONOCYTE %   Date Value Ref Range Status   02/02/2017 8.0 5.0 - 12.0 % Final     EOSINOPHIL %   Date Value Ref Range Status   02/02/2017 1.4 0.3 - 6.2 % Final     BASOPHIL %   Date Value " Ref Range Status   02/02/2017 0.1 0.0 - 1.5 % Final     IMMATURE GRANS %   Date Value Ref Range Status   02/02/2017 0.4 0.0 - 0.5 % Final     NEUTROPHILS, ABSOLUTE   Date Value Ref Range Status   02/02/2017 6.67 1.90 - 8.10 10*3/mm3 Final     LYMPHOCYTES, ABSOLUTE   Date Value Ref Range Status   02/02/2017 1.86 0.90 - 4.80 10*3/mm3 Final     MONOCYTES, ABSOLUTE   Date Value Ref Range Status   02/02/2017 0.76 0.20 - 1.20 10*3/mm3 Final     EOSINOPHILS, ABSOLUTE   Date Value Ref Range Status   02/02/2017 0.13 0.00 - 0.70 10*3/mm3 Final     BASOPHILS, ABSOLUTE   Date Value Ref Range Status   02/02/2017 0.01 0.00 - 0.20 10*3/mm3 Final     IMMATURE GRANS, ABSOLUTE   Date Value Ref Range Status   02/02/2017 0.04 (H) 0.00 - 0.03 10*3/mm3 Final     Rh + / Male infant - declines circumcision    Assessment/Plan:.     Postpartum Day #1  - Continue postoperative course      Rory Pulliam MD

## 2017-02-04 NOTE — PLAN OF CARE
Problem: Patient Care Overview (Adult)  Goal: Plan of Care Review  Outcome: Ongoing (interventions implemented as appropriate)    17   Coping/Psychosocial Response Interventions   Plan Of Care Reviewed With patient;spouse   Patient Care Overview   Progress progress toward functional goals as expected       Goal: Adult Individualization and Mutuality  Outcome: Ongoing (interventions implemented as appropriate)    17   Individualization   Patient Specific Preferences ECTOR, breastfeeding   Patient Specific Goals pain managment   Patient Specific Interventions pain management   Mutuality/Individual Preferences   What Anxieties, Fears or Concerns Do You Have About Your Health or Care? denies   What Questions Do You Have About Your Health or Care? denies   What Information Would Help Us Give You More Personalized Care? denies         Problem: Postpartum ( Delivery) (Adult)  Goal: Signs and Symptoms of Listed Potential Problems Will be Absent or Manageable (Postpartum)  Outcome: Ongoing (interventions implemented as appropriate)    17   Postpartum ( Delivery)   Problems Assessed (Postpartum ) all   Problems Present (Postpartum ) pain

## 2017-02-04 NOTE — ANESTHESIA POSTPROCEDURE EVALUATION
Patient: Sari Lees    Procedure Summary     Date Anesthesia Start Anesthesia Stop Room / Location    17 1314 0007 (17)  [facility]       Procedure Diagnosis Surgeon Provider     SECTION PRIMARY (N/A Abdomen) No diagnosis on file. MD Farhad Kinney MD          Anesthesia Type: epidural  Last vitals  /65 (17 0105)    Temp      Pulse 109 (17 0105)   Resp 18 (17 0105)    SpO2 99 % (17 0103)      Post Anesthesia Care and Evaluation    Patient location during evaluation: PACU  Patient participation: complete - patient participated  Level of consciousness: awake  Pain management: adequate  Airway patency: patent  Anesthetic complications: No anesthetic complications    Cardiovascular status: acceptable  Respiratory status: acceptable  Hydration status: acceptable

## 2017-02-04 NOTE — PLAN OF CARE
Problem: Patient Care Overview (Adult)  Goal: Plan of Care Review  Outcome: Ongoing (interventions implemented as appropriate)    02/03/17 1956   Coping/Psychosocial Response Interventions   Plan Of Care Reviewed With patient;spouse   Patient Care Overview   Progress progress toward functional goals as expected       Goal: Adult Individualization and Mutuality  Outcome: Ongoing (interventions implemented as appropriate)    02/03/17 1956   Individualization   Patient Specific Preferences ECTOR, breastfeeding, vaginal delivery   Patient Specific Goals healthy mom and baby, pain management, vaginal delivery   Patient Specific Interventions epidural   Mutuality/Individual Preferences   What Anxieties, Fears or Concerns Do You Have About Your Health or Care? possibility of C/S   What Questions Do You Have About Your Health or Care? denies   What Information Would Help Us Give You More Personalized Care? denies         Problem: Labor (Cervical Ripen, Induct, Augment) (Adult,Obstetrics,Pediatric)  Goal: Signs and Symptoms of Listed Potential Problems Will be Absent or Manageable (Labor)  Outcome: Ongoing (interventions implemented as appropriate)    02/03/17 1956   Labor (Cervical Ripen, Induct, Augment)   Problems Assessed (Labor) all   Problems Present (Labor) none

## 2017-02-04 NOTE — PLAN OF CARE
Problem: Patient Care Overview (Adult)  Goal: Plan of Care Review  Outcome: Ongoing (interventions implemented as appropriate)    02/03/17 1959   Coping/Psychosocial Response Interventions   Plan Of Care Reviewed With patient   Patient Care Overview   Progress improving       Goal: Adult Individualization and Mutuality  Outcome: Ongoing (interventions implemented as appropriate)    02/03/17 1959   Individualization   Patient Specific Preferences vaginal delivery   Mutuality/Individual Preferences   What Anxieties, Fears or Concerns Do You Have About Your Health or Care? epidural and csection         Problem: Labor (Cervical Ripen, Induct, Augment) (Adult,Obstetrics,Pediatric)  Goal: Signs and Symptoms of Listed Potential Problems Will be Absent or Manageable (Labor)  Outcome: Ongoing (interventions implemented as appropriate)    02/03/17 1959   Labor (Cervical Ripen, Induct, Augment)   Problems Assessed (Labor) all   Problems Present (Labor) none

## 2017-02-04 NOTE — OP NOTE
Procedure: Primary low transverse  delivery    Surgeon: Preston Barajas MD    Assistant: Nitin Hickey M.D.    Preop diagnosis: 21 year old G1 at 41 and one sevenths weeks gestational age with arrest of dilation  2.  Suspected cephalopelvic disproportion    Postop diagnosis: Same    Indications: Patient was admitted for labor induction at 41 weeks of gestation.  She initially had Cervidil cervical ripening.  By 8 AM her cervix was 2-3 cm dilated and she was started on Pitocin.  The patient made very slow progress in early labor.  She progressed to about 3 cm dilated by noon.  She had artificial rupture membranes at around 8:30 AM with an IUPC placed.  The patient was having regular contractions.  By about 3:30 PM she was 4 cm dilated and 80% effaced per Dr. Mendenhall.  I came to the patient around 5:30 PM.  She was still having regular contractions every 1-3 minutes.  On my exam, she was 5 cm dilated, 70% effaced, -2 station.  At the time I noted some significant It.  Additionally, had some concerns for large for gestational age infant.  By Leopold's maneuver I felt the baby was about 9 pounds.  After another 2 hours the patient had made no cervical change despite adequate contractions.  At that time, I made recommendations for primary  section for arrest of dilation and suspected cephalopelvic disproportion.  The patient had wished to wait another 2 hours to see if there is any further cervical change.  After another 2 hours, I checked the patient at 9:30 PM, and again found the patient had no cervical change and was still 57 m dilated, 70% effaced, -2 station.  I again made recommendations for  section.  The patient elected to proceed with  section at that time.    Findings: Male infant, Apgar 8/9, Weight 9 lbs. 1 oz.; occiput posterior presentation; normal uterus, ovaries, and tubes bilaterally    Cord gases: Not obtained    Anesthesia: Epidural    EBL: 900 mL    Pathology:  Placenta    Complications: None    Procedure: Patient was taken to operating room. After adequate epidural anesthesia was placed on OR table in dorsal supine position with a left tilt. Abdomen was prepped and draped in a normal, sterile fashion. Patient identified with two identifiers and timeout performed with all team members and patient agreeing to the planned procedure.  It was confirmed that the patient had received Kefzol 2 grams prior to the start of the incision.  A Pfannenstiel incision made with the knife and taken through the subcutaneous tissue to the fascia with the knife. The fascia scored in the midline and the incision was extended laterally with curved Anderson scissors. The superior rectus fascia was grasped with Kocher clamps times two and divided off the underlying rectus muscles. This was repeated on the inferior aspect. The rectus muscles were then  in the midline and the parietal peritoneum was entered digitally. The incision was extended bluntly and the bladder blade inserted. The vesicouterine peritoneum was tented upward and incision with curved Metzenbaum scissors and the incision was extended laterally. The bladder flap was then created digitally and the bladder blade replaced.  A low transverse uterine incision was made with the knife and extended laterally with finger fraction. The head was found to be occiput posterior and was delivered through the uterine incision. The oropharynx and nares were bulb-suctioned, the cord was clamped times two and cut, and the infant handed to the awaiting pediatricians. The infant was immediately vigorous. Cord blood was then collected. The placenta delivered spontaneously intact. A three vessel cord was noted.  The uterus was delivered onto the maternal abdomen and wiped clean of clots and debris. The uterine incision was then closed in two layers of 0 vicryl, the first layer in a running locked fashion and the second layer imbricating the first.  Good hemostasis was noted. The posterior cul de sac was inspected and the uterus was returned to the abdomen. The pelvic cavity was wiped clean of clots. The uterine incision was again inspected and found to be hemostatic. The parietal peritoneum was reapproximated with 2-0 vicryl. The muscles were reapproximated with 2-0 vicryl. The fascia was closed with 0 vicryl in a running fascia. The subcutaneous tissue was irrigated and made hemostatic with the Bovie and was then closed with 2-0 plain gut. The skin was closed with 4-0 monocryl in a subcuticular fashion. Dermabond and a sterile dressing was applied.  Counts for needles, sponges, laps and instruments were correct times two at the end of the procedure. I was present and scrubbed for the entire procedure. There were no major complications. The patient was transported to the recovery area in stable condition. Mother and baby were bonding well at the end of the procedure.

## 2017-02-04 NOTE — PROGRESS NOTES
S: Patient comfortable.    O:   Vitals:    17 1811 17 1910 17 1925 17 1930   BP: 108/70 109/63 102/72    BP Location:       Patient Position:       Pulse: 86 91 93    Resp:    18   Temp:    98.5 °F (36.9 °C)   TempSrc:    Oral   SpO2:       Weight:       Height:           Cervix: 5/70/-2; even more caput noted at this point    NST: 125/reactive/moderate variability/no decelerations  Tocometry: Contractions every 2 minutes on 12 mU/m of Pitocin    Assessment:  1.  21-year-old  1 at 41 and one sevenths weeks gestational age with arrest of dilation  2.  Suspected cephalopelvic disproportion  3.  Fetal heart tones category 1    Plan:  1.  Patient is made no cervical change over the last 2 hours.  Furthermore, she has made very minimal change since 12:00 this afternoon.  This is despite contractions every 1-3 minutes.  I suspect that the patient has somewhat large infant, probably about 9 pounds size.  Fetal head is at a very high station with a lot of It noted.  The strong suspicion for cephalopelvic disproportion.  My recommendation is to proceed with  section at this time.  I counseled the patient and her partner extensively on the risks, benefits, and alternatives.  We discussed options of continued trial labor versus  section at this point.  We discussed the risks of surgery including increased risk of bleeding, infection, postoperative pain, postoperative pain medication use, increased hospital stay, increased risk of maternal morbidity, need for future C-sections with subsequent morbidity therein, etc.  This is balanced against the increased risk to the fetus of continuing with a trial of labor such as increasing risk of infection, fetal distress, fetal an anoxic brain injury, fetal birth trauma, etc.  Given the weight of this risks versus bounces, my recommendation is to proceed with  section.  The patient verbalizes understanding.  Despite these  recommendations, the patient wishes to continue with trial of labor at this time.  She is aware of the potential risk with continuing with a trial labor.  We'll repeat cervical exam in 2 hours.  We will reassess at that time.

## 2017-02-04 NOTE — LACTATION NOTE
This note was copied from a baby's chart.  P1 . C/S last night at 2307. Baby is postmature , having transitional stool , skin very dry. Mom has been trying to nurse x 0ne hour. Baby was not cueing for feed even after diaper change. Enc ECTOR and staying hydrated.

## 2017-02-04 NOTE — PLAN OF CARE
Problem: Patient Care Overview (Adult)  Goal: Plan of Care Review  Outcome: Ongoing (interventions implemented as appropriate)    17 0722   Coping/Psychosocial Response Interventions   Plan Of Care Reviewed With patient   Patient Care Overview   Progress improving       Goal: Adult Individualization and Mutuality  Outcome: Ongoing (interventions implemented as appropriate)  Goal: Discharge Needs Assessment  Outcome: Ongoing (interventions implemented as appropriate)    Problem: Postpartum ( Delivery) (Adult)  Goal: Signs and Symptoms of Listed Potential Problems Will be Absent or Manageable (Postpartum)  Outcome: Ongoing (interventions implemented as appropriate)    Problem: Breastfeeding (Adult,NICU,,Obstetrics,Pediatric)  Goal: Signs and Symptoms of Listed Potential Problems Will be Absent or Manageable (Breastfeeding)  Outcome: Ongoing (interventions implemented as appropriate)

## 2017-02-04 NOTE — NURSING NOTE
At bedside with patient. Patient very tearful about possibility of C/S. Encouraged patient to allow me to frequently reposition her using the peanut ball and recheck in a couple of hours. Patient very willing to do this and would like a couple more hours to labor, with the understanding that if she still hasn't changed at that time, a decision may be made for a C/S. Patient verbalizes understanding and agrees with plan. Renay Taveras RN

## 2017-02-04 NOTE — PLAN OF CARE
Problem:  Delivery (Adult,Obstetrics,Pediatric)  Goal: Signs and Symptoms of Listed Potential Problems Will be Absent or Manageable ( Delivery)  Outcome: Ongoing (interventions implemented as appropriate)    17 8400    Delivery   Problems Assessed ( Delivery) all   Problems Present ( Delivery) none

## 2017-02-05 LAB
BASOPHILS # BLD AUTO: 0.02 10*3/MM3 (ref 0–0.2)
BASOPHILS NFR BLD AUTO: 0.1 % (ref 0–1.5)
DEPRECATED RDW RBC AUTO: 51.7 FL (ref 37–54)
EOSINOPHIL # BLD AUTO: 0.09 10*3/MM3 (ref 0–0.7)
EOSINOPHIL NFR BLD AUTO: 0.6 % (ref 0.3–6.2)
ERYTHROCYTE [DISTWIDTH] IN BLOOD BY AUTOMATED COUNT: 15.7 % (ref 11.7–13)
HCT VFR BLD AUTO: 27.7 % (ref 35.6–45.5)
HGB BLD-MCNC: 8.5 G/DL (ref 11.9–15.5)
IMM GRANULOCYTES # BLD: 0.05 10*3/MM3 (ref 0–0.03)
IMM GRANULOCYTES NFR BLD: 0.3 % (ref 0–0.5)
LYMPHOCYTES # BLD AUTO: 1.06 10*3/MM3 (ref 0.9–4.8)
LYMPHOCYTES NFR BLD AUTO: 6.8 % (ref 19.6–45.3)
MCH RBC QN AUTO: 28.4 PG (ref 26.9–32)
MCHC RBC AUTO-ENTMCNC: 30.7 G/DL (ref 32.4–36.3)
MCV RBC AUTO: 92.6 FL (ref 80.5–98.2)
MONOCYTES # BLD AUTO: 0.91 10*3/MM3 (ref 0.2–1.2)
MONOCYTES NFR BLD AUTO: 5.9 % (ref 5–12)
NEUTROPHILS # BLD AUTO: 13.38 10*3/MM3 (ref 1.9–8.1)
NEUTROPHILS NFR BLD AUTO: 86.3 % (ref 42.7–76)
PLATELET # BLD AUTO: 208 10*3/MM3 (ref 140–500)
PMV BLD AUTO: 12.1 FL (ref 6–12)
RBC # BLD AUTO: 2.99 10*6/MM3 (ref 3.9–5.2)
WBC NRBC COR # BLD: 15.51 10*3/MM3 (ref 4.5–10.7)

## 2017-02-05 PROCEDURE — 85025 COMPLETE CBC W/AUTO DIFF WBC: CPT | Performed by: OBSTETRICS & GYNECOLOGY

## 2017-02-05 PROCEDURE — 99232 SBSQ HOSP IP/OBS MODERATE 35: CPT | Performed by: OBSTETRICS & GYNECOLOGY

## 2017-02-05 RX ORDER — HYDROMORPHONE HYDROCHLORIDE 1 MG/ML
0.25 INJECTION, SOLUTION INTRAMUSCULAR; INTRAVENOUS; SUBCUTANEOUS
Status: DISCONTINUED | OUTPATIENT
Start: 2017-02-05 | End: 2017-02-07 | Stop reason: HOSPADM

## 2017-02-05 RX ORDER — LACTULOSE 10 G/15ML
10 SOLUTION ORAL 2 TIMES DAILY
Status: DISCONTINUED | OUTPATIENT
Start: 2017-02-05 | End: 2017-02-07 | Stop reason: HOSPADM

## 2017-02-05 RX ADMIN — IBUPROFEN 800 MG: 800 TABLET ORAL at 21:32

## 2017-02-05 RX ADMIN — ACETAMINOPHEN 650 MG: 325 TABLET ORAL at 16:21

## 2017-02-05 RX ADMIN — Medication 1 TABLET: at 09:19

## 2017-02-05 RX ADMIN — LACTULOSE 10 G: 10 SOLUTION ORAL at 18:44

## 2017-02-05 RX ADMIN — IBUPROFEN 800 MG: 800 TABLET ORAL at 13:08

## 2017-02-05 RX ADMIN — LACTULOSE 10 G: 10 SOLUTION ORAL at 13:34

## 2017-02-05 RX ADMIN — IBUPROFEN 800 MG: 800 TABLET ORAL at 04:28

## 2017-02-05 RX ADMIN — DOCUSATE SODIUM 100 MG: 100 CAPSULE, LIQUID FILLED ORAL at 09:17

## 2017-02-05 RX ADMIN — ACETAMINOPHEN 650 MG: 325 TABLET ORAL at 21:32

## 2017-02-05 RX ADMIN — OXYCODONE AND ACETAMINOPHEN 1 TABLET: 10; 325 TABLET ORAL at 04:46

## 2017-02-05 RX ADMIN — SIMETHICONE CHEW TAB 80 MG 80 MG: 80 TABLET ORAL at 18:38

## 2017-02-05 RX ADMIN — OXYCODONE AND ACETAMINOPHEN 1 TABLET: 10; 325 TABLET ORAL at 09:18

## 2017-02-05 RX ADMIN — DOCUSATE SODIUM 100 MG: 100 CAPSULE, LIQUID FILLED ORAL at 18:39

## 2017-02-05 RX ADMIN — LACTULOSE 10 G: 10 SOLUTION ORAL at 13:38

## 2017-02-05 NOTE — PLAN OF CARE
Problem: Patient Care Overview (Adult)  Goal: Plan of Care Review  Outcome: Ongoing (interventions implemented as appropriate)    17 0601   Coping/Psychosocial Response Interventions   Plan Of Care Reviewed With patient   Patient Care Overview   Progress improving       Goal: Adult Individualization and Mutuality  Outcome: Ongoing (interventions implemented as appropriate)  Goal: Discharge Needs Assessment  Outcome: Ongoing (interventions implemented as appropriate)    Problem: Postpartum ( Delivery) (Adult)  Goal: Signs and Symptoms of Listed Potential Problems Will be Absent or Manageable (Postpartum)  Outcome: Ongoing (interventions implemented as appropriate)    Problem: Breastfeeding (Adult,NICU,,Obstetrics,Pediatric)  Goal: Signs and Symptoms of Listed Potential Problems Will be Absent or Manageable (Breastfeeding)  Outcome: Ongoing (interventions implemented as appropriate)

## 2017-02-05 NOTE — PROGRESS NOTES
CC: Patient is postoperative day number 2 status post primary  section.  S: Patient without complaints.  No events overnight.  She is tolerating a regular diet.  Denies flatus.  She has not had a bowel movement.  She has actually not had a bowel movement in about 2 weeks.  She has a long-standing history of IBS and chronic constipation.  She is ambulating and voiding without difficulty.  She is breast-feeding without difficulty.    O:   Vitals:    17 2151 17 2256 17 2330 17 0710   BP:   115/73 108/70   BP Location:   Left arm Left arm   Patient Position:   Sitting Lying   Pulse: 105 109 118 106   Resp: 20 20 20 20   Temp:   98.8 °F (37.1 °C) 98.1 °F (36.7 °C)   TempSrc:   Oral Oral   SpO2: 96% 97%     Weight:       Height:       General: No acute distress, awake and oriented ×3  Fundus: firm/NT/below  Abdomen: Soft, nontender, nondistended, hypoactive bowel sounds  Incision: Clean, dry, and intact with sutures  Extremities: No Tenderness, no Homans sign, 1+ lower extremity edema          Invalid input(s): POTASSIUM, LABRCNTIP, CRCL, LABRCNTIP      CrCl cannot be calculated (Patient has no serum creatinine result on file.).    Results from last 7 days  Lab Units 17  0531 17  2301   WBC 10*3/mm3 15.51* 9.47   HEMOGLOBIN g/dL 8.5* 10.2*   PLATELETS 10*3/mm3 208 194               Rh+, rubella immune, male infant, declined circumcision    Scheduled Meds:  docusate sodium 100 mg Oral BID   ibuprofen 800 mg Oral Q8H   methylergonovine 200 mcg Intramuscular Once   oxytocin 999 mL/hr Intravenous Once   prenatal (CLASSIC) vitamin 1 tablet Oral Daily     Continuous Infusions:  fentaNYL (2 mcg/ml) and ropivacaine (0.2%) in 250 ml (PREMIX) 12 mL/hr Last Rate: Stopped (17)   lactated ringers 125 mL/hr Last Rate: 125 mL/hr (17)     PRN Meds:.•  acetaminophen  •  all purpose nipple ointment  •  bisacodyl  •  diphenhydrAMINE **OR** diphenhydrAMINE **OR**  diphenhydrAMINE  •  diphenhydrAMINE  •  hydrocortisone  •  lanolin  •  magnesium hydroxide  •  ondansetron **OR** ondansetron ODT **OR** ondansetron  •  ondansetron  •  ondansetron  •  oxyCODONE-acetaminophen  •  oxyCODONE-acetaminophen  •  oxyCODONE-acetaminophen  •  simethicone  •  sodium chloride    Assessment:  1.  21-year-old  1 para 1 status post primary low transverse  delivery, postoperative day #2, doing well  2.  Chronic constipation with IBS    Plan:  1.  Patient's pain is well-controlled.  She appears to be due well on postoperative day #2.  We'll only issue is her chronic constipation.  This is been long-standing and dates back to a prior to the pregnancy.  I advised the patient to try to minimize the use of any narcotic pain medication.  We will try to get her on a scheduled regimen with Tylenol and ibuprofen.  She should only take her narcotics if absolutely necessary.  We will try stool softeners and laxatives today.  Anticipate discharge home in 1-2 days.  2. I spent 25 minutes on the floor in the care of this patient (reviewing the chart, consult other physicians, direct patient care), with greater than 50 percent this time in direct counseling/coordination with the patient and her family.

## 2017-02-05 NOTE — LACTATION NOTE
This note was copied from a baby's chart.  Nursed better through the night with nipple shield. Will call for help as needed.

## 2017-02-05 NOTE — LACTATION NOTE
This note was copied from a baby's chart.  Assisted with deep latch to right breast in football hold . Breasts are firm with dense areola that respond somewhat to RPS so continued to use 24 mm nipple shield. Baby is finally waking up and will likely cluster feed through the night.

## 2017-02-06 LAB
HIV1 P24 AG SER QL: NORMAL
HIV1+2 AB SER QL: NORMAL

## 2017-02-06 PROCEDURE — 99231 SBSQ HOSP IP/OBS SF/LOW 25: CPT | Performed by: OBSTETRICS & GYNECOLOGY

## 2017-02-06 PROCEDURE — 87899 AGENT NOS ASSAY W/OPTIC: CPT | Performed by: OBSTETRICS & GYNECOLOGY

## 2017-02-06 PROCEDURE — G0432 EIA HIV-1/HIV-2 SCREEN: HCPCS | Performed by: OBSTETRICS & GYNECOLOGY

## 2017-02-06 RX ADMIN — ACETAMINOPHEN 650 MG: 325 TABLET ORAL at 20:38

## 2017-02-06 RX ADMIN — IBUPROFEN 800 MG: 800 TABLET ORAL at 15:12

## 2017-02-06 RX ADMIN — ACETAMINOPHEN 650 MG: 325 TABLET ORAL at 04:10

## 2017-02-06 RX ADMIN — Medication 1 TABLET: at 08:00

## 2017-02-06 RX ADMIN — IBUPROFEN 800 MG: 800 TABLET ORAL at 08:00

## 2017-02-06 RX ADMIN — DOCUSATE SODIUM 100 MG: 100 CAPSULE, LIQUID FILLED ORAL at 19:47

## 2017-02-06 RX ADMIN — ACETAMINOPHEN 650 MG: 325 TABLET ORAL at 11:03

## 2017-02-06 RX ADMIN — SIMETHICONE CHEW TAB 80 MG 80 MG: 80 TABLET ORAL at 20:38

## 2017-02-06 RX ADMIN — LACTULOSE 10 G: 10 SOLUTION ORAL at 19:47

## 2017-02-06 RX ADMIN — LACTULOSE 10 G: 10 SOLUTION ORAL at 08:02

## 2017-02-06 RX ADMIN — ACETAMINOPHEN 650 MG: 325 TABLET ORAL at 15:12

## 2017-02-06 RX ADMIN — SIMETHICONE CHEW TAB 80 MG 80 MG: 80 TABLET ORAL at 04:10

## 2017-02-06 RX ADMIN — DOCUSATE SODIUM 100 MG: 100 CAPSULE, LIQUID FILLED ORAL at 08:02

## 2017-02-06 NOTE — PLAN OF CARE
Problem: Patient Care Overview (Adult)  Goal: Plan of Care Review  Outcome: Ongoing (interventions implemented as appropriate)    17 0518   Coping/Psychosocial Response Interventions   Plan Of Care Reviewed With patient   Patient Care Overview   Progress improving   Outcome Evaluation   Outcome Summary/Follow up Plan Patient assessments WDL this shift; breastfeeding infant well and often; will continue to monitor       Goal: Adult Individualization and Mutuality  Outcome: Ongoing (interventions implemented as appropriate)  Goal: Discharge Needs Assessment  Outcome: Ongoing (interventions implemented as appropriate)    Problem: Postpartum ( Delivery) (Adult)  Goal: Signs and Symptoms of Listed Potential Problems Will be Absent or Manageable (Postpartum)  Outcome: Ongoing (interventions implemented as appropriate)    Problem: Breastfeeding (Adult,NICU,,Obstetrics,Pediatric)  Goal: Signs and Symptoms of Listed Potential Problems Will be Absent or Manageable (Breastfeeding)  Outcome: Ongoing (interventions implemented as appropriate)

## 2017-02-06 NOTE — LACTATION NOTE
This note was copied from a baby's chart.  Called for help with latching. Able to latch baby without nipple shield and mom has much more colostrum to express. Improving !

## 2017-02-06 NOTE — LACTATION NOTE
Pt reports left nipple cracked and bleeding. Nurse will order APNO. Mom wants to give formula. Offered assistance with breastfeeding and pumping. Mom has OP card

## 2017-02-06 NOTE — PROGRESS NOTES
DAILY PROGRESS NOTE    Patient Identification:  Name: Sari Lees  Age: 21 y.o.  Sex: female  :  1995  MRN: [id number]               Subjective:  Interval History: POD 2      Objective:    Scheduled Meds:  docusate sodium 100 mg Oral BID   ibuprofen 800 mg Oral Q8H   lactulose 10 g Oral BID   methylergonovine 200 mcg Intramuscular Once   oxytocin 999 mL/hr Intravenous Once   prenatal (CLASSIC) vitamin 1 tablet Oral Daily     Continuous Infusions:   PRN Meds:•  acetaminophen  •  all purpose nipple ointment  •  bisacodyl  •  diphenhydrAMINE **OR** diphenhydrAMINE **OR** diphenhydrAMINE  •  diphenhydrAMINE  •  hydrocortisone  •  HYDROmorphone  •  lanolin  •  magnesium hydroxide  •  ondansetron **OR** ondansetron ODT **OR** ondansetron  •  ondansetron  •  ondansetron  •  oxyCODONE-acetaminophen  •  oxyCODONE-acetaminophen  •  oxyCODONE-acetaminophen  •  simethicone  •  sodium chloride    Vital signs in last 24 hours:  Temp:  [97.7 °F (36.5 °C)-98 °F (36.7 °C)] 97.7 °F (36.5 °C)  Heart Rate:  [103-116] 103  Resp:  [16-20] 16  BP: (121-134)/(77-84) 124/84    Intake/Output:    Intake/Output Summary (Last 24 hours) at 17 1152  Last data filed at 17 1332   Gross per 24 hour   Intake   1600 ml   Output    400 ml   Net   1200 ml       Exam:  General Appearance:    Alert, cooperative, no distress, appears stated age   Lungs:     Clear to auscultation bilaterally, respirations unlabored    Heart:    Regular rate and rhythm, S1 and S2 normal, no murmur, rub   or gallop   Abdomen:     Soft, non-tender, bowel sounds active all four quadrants,     no masses, no organomegaly   Extremities:   Extremities normal, atraumatic, no cyanosis or edema   Skin:   Skin color, texture, turgor normal, no rashes or lesions        Data Review:  CBC:   Results from last 7 days  Lab Units 17  0531   WBC 10*3/mm3 15.51*   RBC 10*6/mm3 2.99*     Lab Results (last 24 hours)     ** No results found for the last 24  hours. **        Assessment:  Principal Problem:    Status post  delivery  Active Problems:    Post-term pregnancy, 40-42 weeks of gestation    Slow transit constipation    Post-dates pregnancy    Arrested labor    Appropriate progress.  Had a BM today.  Requests discharge tomorrow    Plan:  Continue current care.  Likely home tomorrow    Abe Mendenhall MD  2017

## 2017-02-07 VITALS
BODY MASS INDEX: 37.15 KG/M2 | TEMPERATURE: 98.6 F | WEIGHT: 223 LBS | HEIGHT: 65 IN | HEART RATE: 112 BPM | SYSTOLIC BLOOD PRESSURE: 121 MMHG | RESPIRATION RATE: 20 BRPM | DIASTOLIC BLOOD PRESSURE: 79 MMHG | OXYGEN SATURATION: 97 %

## 2017-02-07 PROCEDURE — 99238 HOSP IP/OBS DSCHRG MGMT 30/<: CPT | Performed by: OBSTETRICS & GYNECOLOGY

## 2017-02-07 RX ORDER — IBUPROFEN 800 MG/1
800 TABLET ORAL EVERY 8 HOURS PRN
Qty: 40 TABLET | Refills: 2 | Status: SHIPPED | OUTPATIENT
Start: 2017-02-07 | End: 2017-03-22

## 2017-02-07 RX ADMIN — SIMETHICONE CHEW TAB 80 MG 80 MG: 80 TABLET ORAL at 01:43

## 2017-02-07 RX ADMIN — ACETAMINOPHEN 650 MG: 325 TABLET ORAL at 07:21

## 2017-02-07 RX ADMIN — ACETAMINOPHEN 650 MG: 325 TABLET ORAL at 13:46

## 2017-02-07 RX ADMIN — IBUPROFEN 800 MG: 800 TABLET ORAL at 13:46

## 2017-02-07 RX ADMIN — ACETAMINOPHEN 650 MG: 325 TABLET ORAL at 01:43

## 2017-02-07 RX ADMIN — IBUPROFEN 800 MG: 800 TABLET ORAL at 01:43

## 2017-02-07 NOTE — PLAN OF CARE
Problem: Patient Care Overview (Adult)  Goal: Plan of Care Review  Outcome: Ongoing (interventions implemented as appropriate)    17   Coping/Psychosocial Response Interventions   Plan Of Care Reviewed With patient   Patient Care Overview   Progress improving   Outcome Evaluation   Outcome Summary/Follow up Plan Patient assessment WDL this shift; incision clean/dry/no drainage; all questions answered to pt's satisfaction       Goal: Adult Individualization and Mutuality  Outcome: Ongoing (interventions implemented as appropriate)  Goal: Discharge Needs Assessment  Outcome: Ongoing (interventions implemented as appropriate)    Problem: Postpartum ( Delivery) (Adult)  Goal: Signs and Symptoms of Listed Potential Problems Will be Absent or Manageable (Postpartum)  Outcome: Ongoing (interventions implemented as appropriate)

## 2017-02-07 NOTE — DISCHARGE SUMMARY
Date of Discharge:  2017    Discharge Diagnosis: IUP 41 weeks with failure to progress in labor     Presenting Problem/History of Present Illness  Pregnancy [Z33.1]  Post-dates pregnancy [O48.0]     Failure to progress in labor, fetal macrosomia   Hospital Course  Patient is a 21 y.o. female  at 41 weeks who had cervidil/  Pitocin induction for postdates, eventually had Primary LTCS with epidural for failure to progress delivering 1 L/V Male apgars 8/9 weight 9-1 .  Pt did well postpartum except for constipation issues. Pt now being discharged on 4th PP day with good bowel function and afebrile.     Procedures Performed  Procedure(s):   SECTION PRIMARY       Consults:   Consults     No orders found from 2017 to 2/3/2017.          Pertinent Test Results: B+   HgB PP = 8.5   RT Immune     Condition on Discharge:  good    Vital Signs  Temp:  [97.5 °F (36.4 °C)-98.3 °F (36.8 °C)] 98.3 °F (36.8 °C)  Heart Rate:  [102-118] 118  Resp:  [16-20] 20  BP: (113-125)/(73-83) 125/83    Physical Exam:   VSS afebrile  Incision healing well  Abd soft + BM   Lochia scant     Discharge Disposition      Discharge Medications   Sari Lees   Home Medication Instructions BELLA:[id number]    Printed on:17 0920   Medication Information                      Multiple Vitamins-Minerals (MULTIVITAMIN GUMMIES ADULT PO)               psyllium (METAMUCIL) 0.52 G capsule  Take 3 caps every morning and 3 caps every evening.  Drink plenty of water!             Sennosides (SENNA) 8.6 MG capsule  Take 2 capsules once a day.  Do not take more than 1-2 times per week.                 Discharge Diet:   reg  Activity at Discharge:   As tolerated.  No driving 2 weeks   Follow-up Appointments  2 and 6 weeks   Continue PNVs with Fe.  Due to constipation issues may not tolerate extra Iron.    Test Results Pending at Discharge       Ridge Benoit MD  17  9:20 AM    Time: 0925

## 2017-02-07 NOTE — LACTATION NOTE
This note was copied from a baby's chart.  P1 . 4 days Postop. Milk coming in. Mom had started giving formula yesterday and was not attempting to latch due to bleeding nipple. Was distressed that baby continued to loose weight and was fussy. Started mom on HGP because her personal pump was not working. Arranged for one week rental of [employer]. Baby is taking 50cc formula q 3 hours. Mom obtained 35cc from first pumping.  Has handouts and instruction for use and cleaning of breast pump and kit and nipple care. Knows to follow-up with [facility] as desired.

## 2017-02-07 NOTE — PLAN OF CARE
Problem: Patient Care Overview (Adult)  Goal: Plan of Care Review  Outcome: Outcome(s) achieved Date Met:  02/07/17 02/07/17 1004   Coping/Psychosocial Response Interventions   Plan Of Care Reviewed With patient   Patient Care Overview   Progress progress toward functional goals as expected       Goal: Adult Individualization and Mutuality  Outcome: Outcome(s) achieved Date Met:  02/07/17  Goal: Discharge Needs Assessment  Outcome: Outcome(s) achieved Date Met:  02/07/17 02/07/17 1004   Discharge Needs Assessment   Concerns To Be Addressed no discharge needs identified   Readmission Within The Last 30 Days no previous admission in last 30 days   Equipment Needed After Discharge none   Discharge Disposition home or self-care   Current Health   Anticipated Changes Related to Illness none   Living Environment   Transportation Available car

## 2017-02-22 ENCOUNTER — POSTPARTUM VISIT (OUTPATIENT)
Dept: OBSTETRICS AND GYNECOLOGY | Facility: CLINIC | Age: 22
End: 2017-02-22

## 2017-02-22 VITALS
BODY MASS INDEX: 31.86 KG/M2 | WEIGHT: 191.2 LBS | DIASTOLIC BLOOD PRESSURE: 88 MMHG | SYSTOLIC BLOOD PRESSURE: 128 MMHG | HEIGHT: 65 IN

## 2017-02-22 DIAGNOSIS — Z09 POSTOP CHECK: Primary | ICD-10-CM

## 2017-02-22 RX ORDER — FERROUS SULFATE 325(65) MG
325 TABLET ORAL
COMMUNITY
End: 2017-06-07

## 2017-02-22 NOTE — PROGRESS NOTES
Subjective   Sari Lees  is a 21 y.o. female who presents for a 2 week postoperative checkup after  delivery.  Her baby is doing well.  Baby is breast and bottlefeeding.  Patient has normal bowel and bladder function.  She does experience some intermittent left lower quadrant pain.    Chief Complaint   Patient presents with   • Postpartum Care     Pelvic pain in left side       HPI    Past Medical History   Diagnosis Date   • History of pancreatitis    • IBS (irritable bowel syndrome)        Past Surgical History   Procedure Laterality Date   • Cholecystectomy     • Colonoscopy  < 2yrs ago     Had done [facility] by Dr Sergei Junior   • Upper gastrointestinal endoscopy       Done  at [facility] by  Dr Sergei Junior.   • Ercp       Had at [facility] approx one yr ago   •  section N/A 2/3/2017     Procedure:  SECTION PRIMARY;  Surgeon: David Barajas MD;  Location: [facility] LABOR DELIVERY;  Service:        Social History     Social History   • Marital status:      Spouse name: N/A   • Number of children: N/A   • Years of education: N/A     Occupational History   • Not on file.     Social History Main Topics   • Smoking status: Never Smoker   • Smokeless tobacco: Never Used   • Alcohol use No      Comment: OCC. BEFORE PREGNANCY   • Drug use: No   • Sexual activity: Yes     Partners: Male     Other Topics Concern   • Not on file     Social History Narrative       The following portions of the patient's history were reviewed and updated as appropriate: allergies, current medications, past family history, past medical history, past social history, past surgical history and problem list.    Review of Systems    Objective     Physical Exam   Abdominal:   The abdomen is soft and nondistended.  It is nontender to palpation.  Pfannenstiel incision is well approximated.  Induration and erythema are absent.   Nursing note and vitals reviewed.      Assessment    There are no  diagnoses linked to this encounter.    Plan  1.  Appropriate progress 2 weeks postop.  There is some intermittent left lower quadrant pain.  Physical examination is nonfocal.  Counseled.  We will observe for now.  Recheck at the 6 week postpartum checkup.  The patient will call for fever or chills worsening pain or any other concerns 3.  4.

## 2017-03-22 ENCOUNTER — POSTPARTUM VISIT (OUTPATIENT)
Dept: OBSTETRICS AND GYNECOLOGY | Facility: CLINIC | Age: 22
End: 2017-03-22

## 2017-03-22 VITALS
DIASTOLIC BLOOD PRESSURE: 78 MMHG | SYSTOLIC BLOOD PRESSURE: 120 MMHG | BODY MASS INDEX: 30.75 KG/M2 | HEIGHT: 65 IN | WEIGHT: 184.6 LBS

## 2017-03-22 PROCEDURE — 99213 OFFICE O/P EST LOW 20 MIN: CPT | Performed by: OBSTETRICS & GYNECOLOGY

## 2017-06-07 ENCOUNTER — OFFICE VISIT (OUTPATIENT)
Dept: OBSTETRICS AND GYNECOLOGY | Facility: CLINIC | Age: 22
End: 2017-06-07

## 2017-06-07 VITALS
DIASTOLIC BLOOD PRESSURE: 78 MMHG | SYSTOLIC BLOOD PRESSURE: 120 MMHG | WEIGHT: 179.2 LBS | HEIGHT: 65 IN | BODY MASS INDEX: 29.85 KG/M2

## 2017-06-07 DIAGNOSIS — Z01.419 ENCOUNTER FOR WELL WOMAN EXAM WITH ROUTINE GYNECOLOGICAL EXAM: Primary | ICD-10-CM

## 2017-06-07 DIAGNOSIS — Z00.00 ANNUAL PHYSICAL EXAM: ICD-10-CM

## 2017-06-07 PROCEDURE — 99395 PREV VISIT EST AGE 18-39: CPT | Performed by: OBSTETRICS & GYNECOLOGY

## 2017-06-07 NOTE — PROGRESS NOTES
Subjective   Sari Lees  is a 22 y.o. female who presents for a routine gynecologic exam.  She is without complaints today.  She is recently weaned her baby from the breast    Chief Complaint   Patient presents with   • Gynecologic Exam       HPI    Past Medical History:   Diagnosis Date   • History of pancreatitis    • IBS (irritable bowel syndrome)        Past Surgical History:   Procedure Laterality Date   •  SECTION N/A 2/3/2017    Procedure:  SECTION PRIMARY;  Surgeon: David Barajas MD;  Location: [facility] LABOR DELIVERY;  Service:    • CHOLECYSTECTOMY     • COLONOSCOPY  < 2yrs ago    Had done [facility] by Dr Sergei Junior   • ERCP      Had at [facility] approx one yr ago   • UPPER GASTROINTESTINAL ENDOSCOPY      Done  at [facility] by  Dr Sergei Junior.       Social History     Social History   • Marital status:      Spouse name: N/A   • Number of children: N/A   • Years of education: N/A     Occupational History   • Not on file.     Social History Main Topics   • Smoking status: Never Smoker   • Smokeless tobacco: Never Used   • Alcohol use No      Comment: OCC. BEFORE PREGNANCY   • Drug use: No   • Sexual activity: Yes     Partners: Male     Other Topics Concern   • Not on file     Social History Narrative       The following portions of the patient's history were reviewed and updated as appropriate: allergies, current medications, past family history, past medical history, past social history, past surgical history and problem list.    Review of Systems   Constitutional: Negative.    HENT: Negative.    Respiratory: Negative.    Cardiovascular: Negative.    Gastrointestinal: Negative.    Genitourinary: Negative.    Musculoskeletal: Negative.    Skin: Negative.    Allergic/Immunologic: Negative.    Psychiatric/Behavioral: Negative.        Objective     Physical Exam   Constitutional: She is oriented to person, place, and time. She appears well-developed and  well-nourished.   HENT:   Head: Normocephalic and atraumatic.   Cardiovascular: Normal rate and regular rhythm.    Pulmonary/Chest: Effort normal and breath sounds normal. She has no wheezes. She has no rales. Right breast exhibits no mass and no nipple discharge. Left breast exhibits no mass and no nipple discharge.   Abdominal: Soft. She exhibits no distension. There is no tenderness.   Genitourinary: Vagina normal and uterus normal. There is no lesion on the right labia. There is no lesion on the left labia. Cervix exhibits no motion tenderness. Right adnexum displays no mass and no tenderness. Left adnexum displays no mass and no tenderness. No vaginal discharge found.   Neurological: She is alert and oriented to person, place, and time.   Skin: Skin is warm and dry.   Nursing note and vitals reviewed.      Assessment    Sari was seen today for gynecologic exam.    Diagnoses and all orders for this visit:    Encounter for well woman exam with routine gynecological exam  -     IGP,CtNgTv,Apt HPV    Annual physical exam        Plan  1.  Normal gynecologic exam  2.  Contraception.  Options were discussed with the patient and her questions were answered.  She would like to continue to use condoms  3.  4.

## 2017-06-12 LAB
C TRACH RRNA CVX QL NAA+PROBE: NEGATIVE
CYTOLOGIST CVX/VAG CYTO: NORMAL
CYTOLOGY CVX/VAG DOC THIN PREP: NORMAL
DX ICD CODE: NORMAL
HIV 1 & 2 AB SER-IMP: NORMAL
HPV I/H RISK 4 DNA CVX QL PROBE+SIG AMP: NEGATIVE
N GONORRHOEA RRNA CVX QL NAA+PROBE: NEGATIVE
OTHER STN SPEC: NORMAL
PATH REPORT.FINAL DX SPEC: NORMAL
STAT OF ADQ CVX/VAG CYTO-IMP: NORMAL
T VAGINALIS RRNA SPEC QL NAA+PROBE: NEGATIVE

## 2017-08-07 ENCOUNTER — TELEPHONE (OUTPATIENT)
Dept: OBSTETRICS AND GYNECOLOGY | Facility: CLINIC | Age: 22
End: 2017-08-07

## 2021-04-16 ENCOUNTER — BULK ORDERING (OUTPATIENT)
Dept: CASE MANAGEMENT | Facility: OTHER | Age: 26
End: 2021-04-16

## 2021-04-16 DIAGNOSIS — Z23 IMMUNIZATION DUE: ICD-10-CM

## (undated) DEVICE — SUT VIC 2/0 CT1 36IN

## (undated) DEVICE — SUT VIC 0 CT1 36IN J946H

## (undated) DEVICE — 3M™ MEDIPORE™ H SOFT CLOTH SURGICAL TAPE 2864, 4 INCH X 10 YARD (10CM X 9,14M), 12 ROLLS/CASE: Brand: 3M™ MEDIPORE™

## (undated) DEVICE — GLV SURG BIOGEL LTX PF 7

## (undated) DEVICE — SOL IRR H2O BTL 1000ML STRL

## (undated) DEVICE — SUT MNCRYL 3/0 PS2 18IN MCP497G

## (undated) DEVICE — SUT  GUT PLAIN 2/0 CT1 27IN 843H

## (undated) DEVICE — ADHS SKIN DERMABOND TOPICAL HI VISC